# Patient Record
Sex: MALE | Race: WHITE | NOT HISPANIC OR LATINO | Employment: UNEMPLOYED | URBAN - METROPOLITAN AREA
[De-identification: names, ages, dates, MRNs, and addresses within clinical notes are randomized per-mention and may not be internally consistent; named-entity substitution may affect disease eponyms.]

---

## 2017-11-13 RX ORDER — OXYCODONE AND ACETAMINOPHEN 10; 325 MG/1; MG/1
1 TABLET ORAL 3 TIMES DAILY PRN
COMMUNITY
End: 2017-12-05

## 2017-11-13 RX ORDER — LOVASTATIN 20 MG/1
20 TABLET ORAL NIGHTLY
COMMUNITY
End: 2017-11-14

## 2017-11-13 RX ORDER — MORPHINE SULFATE 15 MG/1
15 TABLET, FILM COATED, EXTENDED RELEASE ORAL DAILY
COMMUNITY
End: 2020-07-17

## 2017-11-13 RX ORDER — BUDESONIDE AND FORMOTEROL FUMARATE DIHYDRATE 160; 4.5 UG/1; UG/1
2 AEROSOL RESPIRATORY (INHALATION)
COMMUNITY
End: 2022-12-20

## 2017-11-13 RX ORDER — CYCLOBENZAPRINE HCL 10 MG
10 TABLET ORAL 3 TIMES DAILY PRN
COMMUNITY
End: 2017-11-14

## 2017-11-13 RX ORDER — CLOTRIMAZOLE 1 %
1 CREAM (GRAM) TOPICAL 2 TIMES DAILY
COMMUNITY

## 2017-11-13 RX ORDER — LISINOPRIL 10 MG/1
10 TABLET ORAL DAILY
COMMUNITY

## 2017-11-13 RX ORDER — NORTRIPTYLINE HYDROCHLORIDE 25 MG/1
25 CAPSULE ORAL NIGHTLY PRN
COMMUNITY
End: 2023-02-23

## 2017-11-13 RX ORDER — CARVEDILOL 3.12 MG/1
3.12 TABLET ORAL 2 TIMES DAILY WITH MEALS
COMMUNITY
End: 2022-02-09

## 2017-11-13 RX ORDER — GABAPENTIN 600 MG/1
600 TABLET ORAL 4 TIMES DAILY
COMMUNITY

## 2017-11-13 RX ORDER — MELOXICAM 15 MG/1
15 TABLET ORAL DAILY
COMMUNITY

## 2017-11-14 ENCOUNTER — OFFICE VISIT (OUTPATIENT)
Dept: NEUROSURGERY | Facility: CLINIC | Age: 57
End: 2017-11-14

## 2017-11-14 VITALS — TEMPERATURE: 97.8 F | HEIGHT: 68 IN | BODY MASS INDEX: 19.1 KG/M2 | WEIGHT: 126 LBS

## 2017-11-14 DIAGNOSIS — S22.070A CLOSED WEDGE COMPRESSION FRACTURE OF NINTH THORACIC VERTEBRA, INITIAL ENCOUNTER: Primary | ICD-10-CM

## 2017-11-14 DIAGNOSIS — M51.36 DEGENERATIVE DISC DISEASE, LUMBAR: ICD-10-CM

## 2017-11-14 PROCEDURE — 99243 OFF/OP CNSLTJ NEW/EST LOW 30: CPT | Performed by: NEUROLOGICAL SURGERY

## 2017-11-14 RX ORDER — DIAZEPAM 5 MG/1
5 TABLET ORAL 2 TIMES DAILY PRN
COMMUNITY

## 2017-11-14 RX ORDER — BACLOFEN 10 MG/1
20 TABLET ORAL EVERY 4 HOURS PRN
COMMUNITY
Start: 2017-11-13 | End: 2021-10-28 | Stop reason: ALTCHOICE
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
## 2017-11-22 ENCOUNTER — HOSPITAL ENCOUNTER (OUTPATIENT)
Dept: GENERAL RADIOLOGY | Facility: HOSPITAL | Age: 57
Discharge: HOME | End: 2017-11-22
Admitting: NEUROLOGICAL SURGERY

## 2017-11-22 ENCOUNTER — PREP FOR SURGERY (OUTPATIENT)
Dept: OTHER | Facility: HOSPITAL | Age: 57
End: 2017-11-22

## 2017-11-22 ENCOUNTER — OFFICE VISIT (OUTPATIENT)
Dept: NEUROSURGERY | Facility: CLINIC | Age: 57
End: 2017-11-22

## 2017-11-22 ENCOUNTER — HOSPITAL ENCOUNTER (OUTPATIENT)
Dept: NUCLEAR MEDICINE | Facility: HOSPITAL | Age: 57
Discharge: HOME | End: 2017-11-22
Attending: NEUROLOGICAL SURGERY

## 2017-11-22 VITALS — WEIGHT: 124 LBS | TEMPERATURE: 97.8 F | BODY MASS INDEX: 18.79 KG/M2 | HEIGHT: 68 IN

## 2017-11-22 DIAGNOSIS — S22.070A CLOSED WEDGE COMPRESSION FRACTURE OF NINTH THORACIC VERTEBRA, INITIAL ENCOUNTER: ICD-10-CM

## 2017-11-22 DIAGNOSIS — M48.50XA PATHOLOGICAL COMPRESSION FRACTURE OF VERTEBRA, INITIAL ENCOUNTER (HCC): Primary | ICD-10-CM

## 2017-11-22 DIAGNOSIS — S22.060D CLOSED WEDGE COMPRESSION FRACTURE OF EIGHTH THORACIC VERTEBRA WITH ROUTINE HEALING, SUBSEQUENT ENCOUNTER: ICD-10-CM

## 2017-11-22 DIAGNOSIS — S22.070D CLOSED WEDGE COMPRESSION FRACTURE OF NINTH THORACIC VERTEBRA WITH ROUTINE HEALING, SUBSEQUENT ENCOUNTER: Primary | ICD-10-CM

## 2017-11-22 DIAGNOSIS — M51.36 DEGENERATIVE DISC DISEASE, LUMBAR: ICD-10-CM

## 2017-11-22 PROCEDURE — 99214 OFFICE O/P EST MOD 30 MIN: CPT | Performed by: NEUROLOGICAL SURGERY

## 2017-11-22 PROCEDURE — 78306 BONE IMAGING WHOLE BODY: CPT

## 2017-11-22 PROCEDURE — 0 TECHNETIUM MEDRONATE KIT: Performed by: NEUROLOGICAL SURGERY

## 2017-11-22 PROCEDURE — 72070 X-RAY EXAM THORAC SPINE 2VWS: CPT

## 2017-11-22 PROCEDURE — A9503 TC99M MEDRONATE: HCPCS | Performed by: NEUROLOGICAL SURGERY

## 2017-11-22 RX ORDER — FAMOTIDINE 20 MG/1
20 TABLET, FILM COATED ORAL
Status: CANCELLED | OUTPATIENT
Start: 2017-11-22

## 2017-11-22 RX ORDER — CEFAZOLIN SODIUM 2 G/100ML
2 INJECTION, SOLUTION INTRAVENOUS ONCE
Status: CANCELLED | OUTPATIENT
Start: 2017-11-22 | End: 2017-11-22

## 2017-11-22 RX ORDER — SODIUM CHLORIDE 0.9 % (FLUSH) 0.9 %
1-10 SYRINGE (ML) INJECTION AS NEEDED
Status: CANCELLED | OUTPATIENT
Start: 2017-11-22

## 2017-11-22 RX ORDER — TC 99M MEDRONATE 20 MG/10ML
26.9 INJECTION, POWDER, LYOPHILIZED, FOR SOLUTION INTRAVENOUS
Status: COMPLETED | OUTPATIENT
Start: 2017-11-22 | End: 2017-11-22

## 2017-11-22 RX ADMIN — Medication 26.9 MILLICURIE: at 09:11
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
## 2017-11-27 PROBLEM — M48.50XA PATHOLOGIC COMPRESSION FRACTURE OF VERTEBRA (HCC): Status: ACTIVE | Noted: 2017-11-27

## 2017-11-29 ENCOUNTER — APPOINTMENT (OUTPATIENT)
Dept: PREADMISSION TESTING | Facility: HOSPITAL | Age: 57
End: 2017-11-29

## 2017-11-29 VITALS — HEIGHT: 69 IN | BODY MASS INDEX: 19.13 KG/M2 | WEIGHT: 129.19 LBS

## 2017-11-29 LAB
DEPRECATED RDW RBC AUTO: 46.7 FL (ref 37–54)
ERYTHROCYTE [DISTWIDTH] IN BLOOD BY AUTOMATED COUNT: 13.4 % (ref 11.3–14.5)
HCT VFR BLD AUTO: 39.4 % (ref 38.9–50.9)
HGB BLD-MCNC: 12.4 G/DL (ref 13.1–17.5)
MCH RBC QN AUTO: 29.9 PG (ref 27–31)
MCHC RBC AUTO-ENTMCNC: 31.5 G/DL (ref 32–36)
MCV RBC AUTO: 94.9 FL (ref 80–99)
MRSA DNA SPEC QL NAA+PROBE: NEGATIVE
PLATELET # BLD AUTO: 241 10*3/MM3 (ref 150–450)
PMV BLD AUTO: 10.1 FL (ref 6–12)
POTASSIUM BLD-SCNC: 4.3 MMOL/L (ref 3.5–5.5)
RBC # BLD AUTO: 4.15 10*6/MM3 (ref 4.2–5.76)
WBC NRBC COR # BLD: 13.06 10*3/MM3 (ref 3.5–10.8)

## 2017-12-01 ENCOUNTER — ANESTHESIA (OUTPATIENT)
Dept: PERIOP | Facility: HOSPITAL | Age: 57
End: 2017-12-01

## 2017-12-01 ENCOUNTER — ANESTHESIA EVENT (OUTPATIENT)
Dept: PERIOP | Facility: HOSPITAL | Age: 57
End: 2017-12-01

## 2017-12-01 ENCOUNTER — HOSPITAL ENCOUNTER (OUTPATIENT)
Facility: HOSPITAL | Age: 57
Setting detail: HOSPITAL OUTPATIENT SURGERY
Discharge: HOME | End: 2017-12-01
Attending: NEUROLOGICAL SURGERY | Admitting: NEUROLOGICAL SURGERY

## 2017-12-01 ENCOUNTER — APPOINTMENT (OUTPATIENT)
Dept: GENERAL RADIOLOGY | Facility: HOSPITAL | Age: 57
End: 2017-12-01

## 2017-12-01 VITALS
SYSTOLIC BLOOD PRESSURE: 162 MMHG | TEMPERATURE: 97.9 F | HEIGHT: 69 IN | BODY MASS INDEX: 19.11 KG/M2 | WEIGHT: 129 LBS | RESPIRATION RATE: 18 BRPM | HEART RATE: 56 BPM | OXYGEN SATURATION: 92 % | DIASTOLIC BLOOD PRESSURE: 97 MMHG

## 2017-12-01 DIAGNOSIS — M48.50XA PATHOLOGICAL COMPRESSION FRACTURE OF VERTEBRA, INITIAL ENCOUNTER (HCC): ICD-10-CM

## 2017-12-01 PROCEDURE — 25010000002 NEOSTIGMINE PER 0.5 MG: Performed by: NURSE ANESTHETIST, CERTIFIED REGISTERED

## 2017-12-01 PROCEDURE — C1713 ANCHOR/SCREW BN/BN,TIS/BN: HCPCS | Performed by: NEUROLOGICAL SURGERY

## 2017-12-01 PROCEDURE — 93005 ELECTROCARDIOGRAM TRACING: CPT | Performed by: ANESTHESIOLOGY

## 2017-12-01 PROCEDURE — 25010000002 FENTANYL CITRATE (PF) 100 MCG/2ML SOLUTION: Performed by: NURSE ANESTHETIST, CERTIFIED REGISTERED

## 2017-12-01 PROCEDURE — 22510 PERQ CERVICOTHORACIC INJECT: CPT

## 2017-12-01 PROCEDURE — 25010000002 HYDROCORTISONE SODIUM SUCCINATE 100 MG RECONSTITUTED SOLUTION: Performed by: NURSE ANESTHETIST, CERTIFIED REGISTERED

## 2017-12-01 PROCEDURE — 0 IOPAMIDOL 41 % SOLUTION: Performed by: NEUROLOGICAL SURGERY

## 2017-12-01 PROCEDURE — 22515 PERQ VERTEBRAL AUGMENTATION: CPT | Performed by: NEUROLOGICAL SURGERY

## 2017-12-01 PROCEDURE — 25010000003 CEFAZOLIN IN DEXTROSE 2-4 GM/100ML-% SOLUTION: Performed by: NEUROLOGICAL SURGERY

## 2017-12-01 PROCEDURE — 71010 HC CHEST PA OR AP: CPT

## 2017-12-01 PROCEDURE — 25010000002 ONDANSETRON PER 1 MG: Performed by: NURSE ANESTHETIST, CERTIFIED REGISTERED

## 2017-12-01 PROCEDURE — 22513 PERQ VERTEBRAL AUGMENTATION: CPT | Performed by: NEUROLOGICAL SURGERY

## 2017-12-01 PROCEDURE — 25010000002 PROPOFOL 10 MG/ML EMULSION: Performed by: NURSE ANESTHETIST, CERTIFIED REGISTERED

## 2017-12-01 DEVICE — BONE CEMENT C10A KYPHON ACTIVOS 10
Type: IMPLANTABLE DEVICE | Site: SPINE THORACIC | Status: FUNCTIONAL
Brand: ACTIVOS™ 10 BONE CEMENT

## 2017-12-01 RX ORDER — FAMOTIDINE 10 MG/ML
20 INJECTION, SOLUTION INTRAVENOUS ONCE
Status: CANCELLED | OUTPATIENT
Start: 2017-12-01 | End: 2017-12-01

## 2017-12-01 RX ORDER — PROMETHAZINE HYDROCHLORIDE 25 MG/ML
6.25 INJECTION, SOLUTION INTRAMUSCULAR; INTRAVENOUS ONCE AS NEEDED
Status: DISCONTINUED | OUTPATIENT
Start: 2017-12-01 | End: 2017-12-04 | Stop reason: HOSPADM

## 2017-12-01 RX ORDER — ATRACURIUM BESYLATE 10 MG/ML
INJECTION, SOLUTION INTRAVENOUS AS NEEDED
Status: DISCONTINUED | OUTPATIENT
Start: 2017-12-01 | End: 2017-12-01 | Stop reason: SURG

## 2017-12-01 RX ORDER — MELOXICAM 7.5 MG/1
15 TABLET ORAL ONCE
Status: DISCONTINUED | OUTPATIENT
Start: 2017-12-01 | End: 2017-12-01 | Stop reason: HOSPADM

## 2017-12-01 RX ORDER — ACETAMINOPHEN 500 MG
1000 TABLET ORAL ONCE
Status: COMPLETED | OUTPATIENT
Start: 2017-12-01 | End: 2017-12-01

## 2017-12-01 RX ORDER — LIDOCAINE HYDROCHLORIDE 10 MG/ML
INJECTION, SOLUTION INFILTRATION; PERINEURAL AS NEEDED
Status: DISCONTINUED | OUTPATIENT
Start: 2017-12-01 | End: 2017-12-01 | Stop reason: SURG

## 2017-12-01 RX ORDER — ESMOLOL HYDROCHLORIDE 10 MG/ML
INJECTION INTRAVENOUS AS NEEDED
Status: DISCONTINUED | OUTPATIENT
Start: 2017-12-01 | End: 2017-12-01 | Stop reason: SURG

## 2017-12-01 RX ORDER — CEFAZOLIN SODIUM 2 G/100ML
2 INJECTION, SOLUTION INTRAVENOUS ONCE
Status: COMPLETED | OUTPATIENT
Start: 2017-12-01 | End: 2017-12-01

## 2017-12-01 RX ORDER — PROMETHAZINE HYDROCHLORIDE 25 MG/1
25 TABLET ORAL ONCE AS NEEDED
Status: DISCONTINUED | OUTPATIENT
Start: 2017-12-01 | End: 2017-12-04 | Stop reason: HOSPADM

## 2017-12-01 RX ORDER — GLYCOPYRROLATE 0.2 MG/ML
INJECTION INTRAMUSCULAR; INTRAVENOUS AS NEEDED
Status: DISCONTINUED | OUTPATIENT
Start: 2017-12-01 | End: 2017-12-01 | Stop reason: SURG

## 2017-12-01 RX ORDER — FENTANYL CITRATE 50 UG/ML
50 INJECTION, SOLUTION INTRAMUSCULAR; INTRAVENOUS
Status: COMPLETED | OUTPATIENT
Start: 2017-12-01 | End: 2017-12-01

## 2017-12-01 RX ORDER — ONDANSETRON 2 MG/ML
INJECTION INTRAMUSCULAR; INTRAVENOUS AS NEEDED
Status: DISCONTINUED | OUTPATIENT
Start: 2017-12-01 | End: 2017-12-01 | Stop reason: SURG

## 2017-12-01 RX ORDER — FAMOTIDINE 20 MG/1
20 TABLET, FILM COATED ORAL ONCE
Status: COMPLETED | OUTPATIENT
Start: 2017-12-01 | End: 2017-12-01

## 2017-12-01 RX ORDER — SODIUM CHLORIDE, SODIUM LACTATE, POTASSIUM CHLORIDE, CALCIUM CHLORIDE 600; 310; 30; 20 MG/100ML; MG/100ML; MG/100ML; MG/100ML
9 INJECTION, SOLUTION INTRAVENOUS CONTINUOUS
Status: DISCONTINUED | OUTPATIENT
Start: 2017-12-01 | End: 2017-12-04 | Stop reason: HOSPADM

## 2017-12-01 RX ORDER — PREDNISONE 2.5 MG
2.5 TABLET ORAL DAILY
Status: ON HOLD | COMMUNITY
End: 2017-12-01 | Stop reason: SDUPTHER

## 2017-12-01 RX ORDER — PROPOFOL 10 MG/ML
VIAL (ML) INTRAVENOUS AS NEEDED
Status: DISCONTINUED | OUTPATIENT
Start: 2017-12-01 | End: 2017-12-01 | Stop reason: SURG

## 2017-12-01 RX ORDER — PROMETHAZINE HYDROCHLORIDE 25 MG/1
25 SUPPOSITORY RECTAL ONCE AS NEEDED
Status: DISCONTINUED | OUTPATIENT
Start: 2017-12-01 | End: 2017-12-04 | Stop reason: HOSPADM

## 2017-12-01 RX ORDER — SODIUM CHLORIDE 0.9 % (FLUSH) 0.9 %
1-10 SYRINGE (ML) INJECTION AS NEEDED
Status: DISCONTINUED | OUTPATIENT
Start: 2017-12-01 | End: 2017-12-01 | Stop reason: HOSPADM

## 2017-12-01 RX ORDER — MAGNESIUM HYDROXIDE 1200 MG/15ML
LIQUID ORAL AS NEEDED
Status: DISCONTINUED | OUTPATIENT
Start: 2017-12-01 | End: 2017-12-01 | Stop reason: HOSPADM

## 2017-12-01 RX ORDER — EPHEDRINE SULFATE 50 MG/ML
5 INJECTION, SOLUTION INTRAVENOUS ONCE AS NEEDED
Status: DISCONTINUED | OUTPATIENT
Start: 2017-12-01 | End: 2017-12-04 | Stop reason: HOSPADM

## 2017-12-01 RX ORDER — LIDOCAINE HYDROCHLORIDE 10 MG/ML
0.5 INJECTION, SOLUTION EPIDURAL; INFILTRATION; INTRACAUDAL; PERINEURAL ONCE AS NEEDED
Status: COMPLETED | OUTPATIENT
Start: 2017-12-01 | End: 2017-12-01

## 2017-12-01 RX ADMIN — FENTANYL CITRATE 50 MCG: 50 INJECTION INTRAMUSCULAR; INTRAVENOUS at 09:49

## 2017-12-01 RX ADMIN — LIDOCAINE HYDROCHLORIDE 50 MG: 10 INJECTION, SOLUTION INFILTRATION; PERINEURAL at 08:16

## 2017-12-01 RX ADMIN — FAMOTIDINE 20 MG: 20 TABLET, FILM COATED ORAL at 07:19

## 2017-12-01 RX ADMIN — Medication 4 MG: at 09:02

## 2017-12-01 RX ADMIN — ESMOLOL HYDROCHLORIDE 20 MG: 10 INJECTION, SOLUTION INTRAVENOUS at 08:16

## 2017-12-01 RX ADMIN — ACETAMINOPHEN 1000 MG: 500 TABLET ORAL at 07:32

## 2017-12-01 RX ADMIN — GLYCOPYRROLATE 0.4 MG: 0.2 INJECTION, SOLUTION INTRAMUSCULAR; INTRAVENOUS at 09:02

## 2017-12-01 RX ADMIN — FENTANYL CITRATE 50 MCG: 50 INJECTION INTRAMUSCULAR; INTRAVENOUS at 09:42

## 2017-12-01 RX ADMIN — FENTANYL CITRATE 50 MCG: 50 INJECTION INTRAMUSCULAR; INTRAVENOUS at 09:34

## 2017-12-01 RX ADMIN — PROPOFOL 100 MG: 10 INJECTION, EMULSION INTRAVENOUS at 08:16

## 2017-12-01 RX ADMIN — FENTANYL CITRATE 50 MCG: 50 INJECTION INTRAMUSCULAR; INTRAVENOUS at 10:21

## 2017-12-01 RX ADMIN — ATRACURIUM BESYLATE 25 MG: 10 INJECTION, SOLUTION INTRAVENOUS at 08:16

## 2017-12-01 RX ADMIN — EPHEDRINE SULFATE 15 MG: 50 INJECTION INTRAMUSCULAR; INTRAVENOUS; SUBCUTANEOUS at 08:29

## 2017-12-01 RX ADMIN — CEFAZOLIN SODIUM 2 G: 2 INJECTION, SOLUTION INTRAVENOUS at 08:14

## 2017-12-01 RX ADMIN — ONDANSETRON 4 MG: 2 INJECTION INTRAMUSCULAR; INTRAVENOUS at 08:55

## 2017-12-01 RX ADMIN — FENTANYL CITRATE 50 MCG: 50 INJECTION INTRAMUSCULAR; INTRAVENOUS at 11:20

## 2017-12-01 RX ADMIN — SODIUM CHLORIDE, POTASSIUM CHLORIDE, SODIUM LACTATE AND CALCIUM CHLORIDE 9 ML/HR: 600; 310; 30; 20 INJECTION, SOLUTION INTRAVENOUS at 07:04

## 2017-12-01 RX ADMIN — HYDROCORTISONE SODIUM SUCCINATE 100 MG: 100 INJECTION, POWDER, FOR SOLUTION INTRAMUSCULAR; INTRAVENOUS at 08:22

## 2017-12-01 RX ADMIN — LIDOCAINE HYDROCHLORIDE 0.2 ML: 10 INJECTION, SOLUTION EPIDURAL; INFILTRATION; INTRACAUDAL; PERINEURAL at 07:04

## 2017-12-01 NOTE — ANESTHESIA POSTPROCEDURE EVALUATION
Patient: Rodolfo Hill    Procedure Summary     Date Anesthesia Start Anesthesia Stop Room / Location    12/01/17 0814 0911 [facility] / [facility]       Procedure Diagnosis Surgeon Provider    KYPHOPLASTY T8 and T9 (N/A Spine Lumbar) Pathological compression fracture of vertebra, initial encounter  (Pathological compression fracture of vertebra, initial encounter [M48.50XA]) MD Fahad Boss MD          Anesthesia Type: general  Last vitals  BP   154/81 (12/01/17 0908)   Temp   97.9 °F (36.6 °C) (12/01/17 0908)   Pulse   58 (12/01/17 0908)   Resp   18 (12/01/17 0908)     SpO2   100 % (12/01/17 0908)     Post Anesthesia Care and Evaluation    Patient location during evaluation: PACU  Patient participation: complete - patient participated  Level of consciousness: awake and alert  Pain score: 0  Pain management: adequate  Airway patency: patent  Anesthetic complications: No anesthetic complications  PONV Status: none  Cardiovascular status: hemodynamically stable and acceptable  Respiratory status: nonlabored ventilation, acceptable and nasal cannula  Hydration status: acceptable

## 2017-12-01 NOTE — ANESTHESIA PROCEDURE NOTES
Airway  Urgency: elective    Airway not difficult    General Information and Staff    Patient location during procedure: OR  CRNA: [facility]UGH[facility]    Indications and Patient Condition  Indications for airway management: airway protection    Preoxygenated: yes  MILS not maintained throughout  Mask difficulty assessment: 1 - vent by mask    Final Airway Details  Final airway type: endotracheal airway      Successful airway: ETT  Cuffed: yes   Successful intubation technique: direct laryngoscopy  Endotracheal tube insertion site: oral  Blade: Stacy  Blade size: #3  ETT size: 7.0 mm  Cormack-Lehane Classification: grade I - full view of glottis  Placement verified by: chest auscultation and capnometry   Cuff volume (mL): 8  Measured from: lips  ETT to lips (cm): 20  Number of attempts at approach: 1    Additional Comments  Negative epigastric sounds, Breath sound equal bilaterally with symmetric chest rise and fall. Atraumatic, no damage to lips or gums during intubation

## 2017-12-01 NOTE — INTERVAL H&P NOTE
"Pre-Op H&P (See Recent Office Note Attached)    Chief complaint: mid back pain    Review of Systems:  General ROS:  no fever, chills, rashes, No change since last office visit  Cardiovascular ROS: no chest pain or dyspnea on exertion  Respiratory ROS: no cough, + baseline shortness of breath, or wheezing    Immunization History:   Influenza:  Yes 2017  Pneumococcal:  Yes < 5 years  Tetanus:  unknown    Vital Signs:  /92 (BP Location: Right arm, Patient Position: Lying)  Pulse 67  Temp 97.7 °F (36.5 °C) (Temporal Artery )   Resp 20  Ht 69\" (175.3 cm)  Wt 129 lb (58.5 kg)  SpO2 92%  BMI 19.05 kg/m2    Physical Exam:    CV:  S1S2 regular rate and rhythm, no murmur               Resp:  Clear to auscultation; respirations regular, even and unlabored    Results Review:    I reviewed the patient's new clinical results.    Cancer Staging (if applicable)  Cancer Patient: __ yes _x_no __unknown; If yes, clinical stage T:__ N:__M:__, stage group or __N/A    Jane Wyatt, APRN  12/1/2017   7:28 AM      "

## 2017-12-01 NOTE — OP NOTE
NEUROSURGICAL OPERATIVE NOTE        PREOPERATIVE DIAGNOSIS:    T8 and T9 compression fractures  Osteoporosis      POSTOPERATIVE DIAGNOSIS:  Same      PROCEDURE:  T8 and T9 kyphoplasty      SURGEON:  Fahad Santana M.D.      ASSISTANT:  None      ANESTHESIA:  General      ESTIMATED BLOOD LOSS:  Minimal      SPECIMEN:  None      DRAINS:  None      COMPLICATIONS:  None      CLINICAL NOTE:  The patient is a 57-year-old gentleman with known osteoporosis and arthritis who presents with severe unremitting mid back pain.  Studies reveals subacute fractures at T8 and T9, much more significant at T9.  Given his ongoing symptoms he now presents for kyphoplasty.  The nature of the procedure as well as the potential risks, complications, limitations, and alternatives to the procedure were discussed at length with the patient and the patient has agreed to proceed with surgery.      TECHNICAL NOTE:  The patient was brought to the operating room and while on his cart general endotracheal anesthesia was achieved.  He was then turned prone onto blanket rolls maintained longitudinally under his chest and abdomen. Special care was ensured to protect pressure points. Two C-arms were brought into use and aligned to provide good AP and lateral imaging of the T8 and T9 fracture sites. The mid back was prepared and draped in usual fashion. Punctate incisions were made lateral to the pedicles as noted on the AP imaging. One-Step cannulae were impacted through the pedicles into the dorsal vertebral body. This was followed by hand drill on each side. Then, 15 mm Kyphon balloons were inserted and inflated. I was able to reduce the T9 fracture. The balloons were lowered and removed and approximately 3 mL of methylmethacrylate were instilled on each side in the standard fashion. The cement was allowed to harden and the working cannulae were removed. The procedure was then repeated in its entirety at the T8 level. Once again, I was able to  reduce the fracture which, by the way, had progressed from the time of his last study. Once again, 3 mL of methylmethacrylate were instilled on each side. The cement was allowed to harden and the working cannulae were removed. The punctate incisions were closed in a subcuticular fashion with a single 3-0 Vicryl suture at each site. These 4 punctate incisions were then closed with a dermal sealant. The patient was rolled onto his cart, extubated, and taken to recovery room in satisfactory condition. He did receive preoperative antibiotics, and there were no overt intraoperative complications.            Fahad Santana M.D.

## 2017-12-01 NOTE — ANESTHESIA PREPROCEDURE EVALUATION
Anesthesia Evaluation     NPO Solid Status: > 8 hours  NPO Liquid Status: > 8 hours     Airway   Mallampati: II  TM distance: >3 FB  Neck ROM: full  no difficulty expected  Dental    (+) edentulous    Pulmonary - normal exam   (+) a smoker Current, COPD,   Cardiovascular     ECG reviewed  Rhythm: regular  Rate: normal    (+) hypertension,   (-) pacemaker, angina, murmur, cardiac stents, CABG      Neuro/Psych  (-) seizures, TIA, CVA  GI/Hepatic/Renal/Endo    (-) liver disease, no renal disease, diabetes    Musculoskeletal     (+) back pain (has RA rheumatoid arthritis, takes prednisone),   Abdominal    Substance History      OB/GYN          Other                                        Anesthesia Plan    ASA 3     general   (GA  Careful,neck in neutral intubation  Steroid boost  Solu cortef 100mg)

## 2017-12-04 ENCOUNTER — TELEPHONE (OUTPATIENT)
Dept: NEUROSURGERY | Facility: CLINIC | Age: 57
End: 2017-12-04

## 2017-12-04 ENCOUNTER — HOSPITAL ENCOUNTER (EMERGENCY)
Facility: HOSPITAL | Age: 57
Discharge: LEFT BEFORE DISCHARGE | End: 2017-12-04

## 2017-12-04 VITALS
RESPIRATION RATE: 20 BRPM | HEIGHT: 67 IN | SYSTOLIC BLOOD PRESSURE: 178 MMHG | HEART RATE: 81 BPM | DIASTOLIC BLOOD PRESSURE: 96 MMHG | BODY MASS INDEX: 20.25 KG/M2 | OXYGEN SATURATION: 93 % | WEIGHT: 129 LBS | TEMPERATURE: 97.8 F

## 2017-12-04 PROCEDURE — 99211 OFF/OP EST MAY X REQ PHY/QHP: CPT

## 2017-12-04 NOTE — ED NOTES
Patient left without being seen. Says he is going to see Dr. Donis in the morning.     Krissy Laurent  12/04/17 2934

## 2017-12-04 NOTE — TELEPHONE ENCOUNTER
Provider:  Max  Caller: patient  Time of call:  1:27   Phone #:  [phone]  Surgery:  Kyphoplasty T8-T9  Surgery Date:  12-01-17  Last visit:   same  Next visit: None    YOSHI:         Reason for call:  Patient states that immediately after his kypho, he had increased low back pain and he says his right lung hurts.  I asked him to give me time to get a PA, and he said that he is already in route.  He has been doubling up on Percocet for his pain.  I told him that he would run out early and he didn't seem to care.

## 2017-12-05 ENCOUNTER — HOSPITAL ENCOUNTER (EMERGENCY)
Facility: HOSPITAL | Age: 57
Discharge: HOME | End: 2017-12-05
Attending: EMERGENCY MEDICINE | Admitting: EMERGENCY MEDICINE

## 2017-12-05 ENCOUNTER — APPOINTMENT (OUTPATIENT)
Dept: CT IMAGING | Facility: HOSPITAL | Age: 57
End: 2017-12-05

## 2017-12-05 VITALS
OXYGEN SATURATION: 96 % | BODY MASS INDEX: 20.28 KG/M2 | RESPIRATION RATE: 16 BRPM | SYSTOLIC BLOOD PRESSURE: 174 MMHG | WEIGHT: 129.19 LBS | HEART RATE: 58 BPM | DIASTOLIC BLOOD PRESSURE: 94 MMHG | TEMPERATURE: 98.5 F | HEIGHT: 67 IN

## 2017-12-05 DIAGNOSIS — I10 ELEVATED BLOOD PRESSURE READING WITH DIAGNOSIS OF HYPERTENSION: Primary | ICD-10-CM

## 2017-12-05 DIAGNOSIS — S22.41XA CLOSED FRACTURE OF MULTIPLE RIBS OF RIGHT SIDE, INITIAL ENCOUNTER: ICD-10-CM

## 2017-12-05 DIAGNOSIS — S22.078A OTHER CLOSED FRACTURE OF NINTH THORACIC VERTEBRA, INITIAL ENCOUNTER (HCC): ICD-10-CM

## 2017-12-05 DIAGNOSIS — R07.9 CHEST PAIN, UNSPECIFIED TYPE: ICD-10-CM

## 2017-12-05 LAB
ALBUMIN SERPL-MCNC: 4.2 G/DL (ref 3.2–4.8)
ALBUMIN/GLOB SERPL: 1.7 G/DL (ref 1.5–2.5)
ALP SERPL-CCNC: 134 U/L (ref 25–100)
ALT SERPL W P-5'-P-CCNC: 9 U/L (ref 7–40)
ANION GAP SERPL CALCULATED.3IONS-SCNC: 3 MMOL/L (ref 3–11)
AST SERPL-CCNC: 12 U/L (ref 0–33)
BASOPHILS # BLD AUTO: 0.03 10*3/MM3 (ref 0–0.2)
BASOPHILS NFR BLD AUTO: 0.4 % (ref 0–1)
BILIRUB SERPL-MCNC: 0.3 MG/DL (ref 0.3–1.2)
BNP SERPL-MCNC: 73 PG/ML (ref 0–100)
BUN BLD-MCNC: 17 MG/DL (ref 9–23)
BUN/CREAT SERPL: 24.3 (ref 7–25)
CALCIUM SPEC-SCNC: 9.4 MG/DL (ref 8.7–10.4)
CHLORIDE SERPL-SCNC: 101 MMOL/L (ref 99–109)
CO2 SERPL-SCNC: 34 MMOL/L (ref 20–31)
CREAT BLD-MCNC: 0.7 MG/DL (ref 0.6–1.3)
DEPRECATED RDW RBC AUTO: 46 FL (ref 37–54)
EOSINOPHIL # BLD AUTO: 0.28 10*3/MM3 (ref 0–0.3)
EOSINOPHIL NFR BLD AUTO: 3.3 % (ref 0–3)
ERYTHROCYTE [DISTWIDTH] IN BLOOD BY AUTOMATED COUNT: 13.4 % (ref 11.3–14.5)
GFR SERPL CREATININE-BSD FRML MDRD: 116 ML/MIN/1.73
GLOBULIN UR ELPH-MCNC: 2.5 GM/DL
GLUCOSE BLD-MCNC: 102 MG/DL (ref 70–100)
HCT VFR BLD AUTO: 39 % (ref 38.9–50.9)
HGB BLD-MCNC: 12.6 G/DL (ref 13.1–17.5)
IMM GRANULOCYTES # BLD: 0.02 10*3/MM3 (ref 0–0.03)
IMM GRANULOCYTES NFR BLD: 0.2 % (ref 0–0.6)
LYMPHOCYTES # BLD AUTO: 1.16 10*3/MM3 (ref 0.6–4.8)
LYMPHOCYTES NFR BLD AUTO: 13.8 % (ref 24–44)
MCH RBC QN AUTO: 30.2 PG (ref 27–31)
MCHC RBC AUTO-ENTMCNC: 32.3 G/DL (ref 32–36)
MCV RBC AUTO: 93.5 FL (ref 80–99)
MONOCYTES # BLD AUTO: 0.88 10*3/MM3 (ref 0–1)
MONOCYTES NFR BLD AUTO: 10.5 % (ref 0–12)
NEUTROPHILS # BLD AUTO: 6.02 10*3/MM3 (ref 1.5–8.3)
NEUTROPHILS NFR BLD AUTO: 71.8 % (ref 41–71)
PLATELET # BLD AUTO: 231 10*3/MM3 (ref 150–450)
PMV BLD AUTO: 9.6 FL (ref 6–12)
POTASSIUM BLD-SCNC: 4.2 MMOL/L (ref 3.5–5.5)
PROT SERPL-MCNC: 6.7 G/DL (ref 5.7–8.2)
RBC # BLD AUTO: 4.17 10*6/MM3 (ref 4.2–5.76)
SODIUM BLD-SCNC: 138 MMOL/L (ref 132–146)
TROPONIN I SERPL-MCNC: 0 NG/ML (ref 0–0.07)
WBC NRBC COR # BLD: 8.39 10*3/MM3 (ref 3.5–10.8)

## 2017-12-05 PROCEDURE — 72131 CT LUMBAR SPINE W/O DYE: CPT

## 2017-12-05 PROCEDURE — 85025 COMPLETE CBC W/AUTO DIFF WBC: CPT | Performed by: EMERGENCY MEDICINE

## 2017-12-05 PROCEDURE — 99284 EMERGENCY DEPT VISIT MOD MDM: CPT

## 2017-12-05 PROCEDURE — 84484 ASSAY OF TROPONIN QUANT: CPT

## 2017-12-05 PROCEDURE — 72128 CT CHEST SPINE W/O DYE: CPT

## 2017-12-05 PROCEDURE — 93005 ELECTROCARDIOGRAM TRACING: CPT | Performed by: EMERGENCY MEDICINE

## 2017-12-05 PROCEDURE — 0 IOPAMIDOL PER 1 ML: Performed by: EMERGENCY MEDICINE

## 2017-12-05 PROCEDURE — 71275 CT ANGIOGRAPHY CHEST: CPT

## 2017-12-05 PROCEDURE — 80053 COMPREHEN METABOLIC PANEL: CPT | Performed by: EMERGENCY MEDICINE

## 2017-12-05 PROCEDURE — 83880 ASSAY OF NATRIURETIC PEPTIDE: CPT | Performed by: EMERGENCY MEDICINE

## 2017-12-05 RX ORDER — OXYCODONE AND ACETAMINOPHEN 10; 325 MG/1; MG/1
1 TABLET ORAL ONCE
Status: COMPLETED | OUTPATIENT
Start: 2017-12-05 | End: 2017-12-05

## 2017-12-05 RX ORDER — OXYCODONE AND ACETAMINOPHEN 10; 325 MG/1; MG/1
1 TABLET ORAL EVERY 4 HOURS PRN
Qty: 16 TABLET | Refills: 0 | Status: SHIPPED | OUTPATIENT
Start: 2017-12-05 | End: 2018-01-05

## 2017-12-05 RX ADMIN — OXYCODONE HYDROCHLORIDE AND ACETAMINOPHEN 1 TABLET: 10; 325 TABLET ORAL at 14:42

## 2017-12-05 RX ADMIN — IOPAMIDOL 65 ML: 755 INJECTION, SOLUTION INTRAVENOUS at 12:25

## 2017-12-05 RX ADMIN — OXYCODONE HYDROCHLORIDE AND ACETAMINOPHEN 1 TABLET: 10; 325 TABLET ORAL at 11:04

## 2017-12-05 NOTE — TELEPHONE ENCOUNTER
Reviewed [facility] ED notes and prelim results of CT lumbar spine and CT thoracic spine as I am on call with Dr. Santana today/patricia.  Will await any formal neurosurgical consultation as needed.

## 2017-12-05 NOTE — ED PROVIDER NOTES
Subjective   HPI Comments: Rodolfo Hill is a 57 y.o.male who presents to the ED with c/o left sided chest pain onset 4 days ago. He states his left sided chest pain worsens with coughing. He also c/o of right sided shoulder pain, lower back pain, fever, chills, cough but denies shortness of breath or any other complaints at this time. He reports he developed a deep cough 4 days ago and right shoulder pain yesterday. He states that his lower back pain is significant worse than his baseline.  He reports that he woke up from surgery several days ago with the pain under his right scapula.  He recently had a kyphoplasty performed by Dr. Donis on 12/1/2017. He has a history of MI, COPD, and osteoarthritis.       Patient is a 57 y.o. male presenting with chest pain.   History provided by:  Patient  Chest Pain   Pain location:  L chest  Pain quality: aching    Pain radiates to:  R shoulder, lower back and mid back  Pain severity:  Moderate  Onset quality:  Gradual  Duration:  4 days  Timing:  Constant  Progression:  Worsening  Chronicity:  New  Context: at rest    Context comment:  Coughing  Relieved by:  Nothing  Worsened by:  Coughing and movement  Ineffective treatments:  Rest  Associated symptoms: back pain, cough, fever and weakness    Associated symptoms: no shortness of breath    Risk factors: coronary artery disease and male sex        Review of Systems   Constitutional: Positive for chills and fever.   Respiratory: Positive for cough. Negative for shortness of breath.    Cardiovascular: Positive for chest pain.   Musculoskeletal: Positive for arthralgias (right shoulder pain) and back pain.   Neurological: Positive for weakness.   All other systems reviewed and are negative.      Past Medical History:   Diagnosis Date   • Anxiety    • COPD (chronic obstructive pulmonary disease)    • Depression    • Hyperlipidemia    • Hypertension    • Myocardial infarct    • Osteoporosis    • Rheumatoid arthritis    • Sleep  apnea with use of continuous positive airway pressure (CPAP)        Allergies   Allergen Reactions   • Alupent [Metaproterenol] Arrhythmia     Chest pain, increased heart rate.       Past Surgical History:   Procedure Laterality Date   • CARPAL TUNNEL RELEASE     • KYPHOPLASTY N/A 12/1/2017    Procedure: KYPHOPLASTY T8-9;  Surgeon: Fahad Santana MD;  Location: [facility];  Service:        Family History   Problem Relation Age of Onset   • Cancer Father        Social History     Social History   • Marital status: Legally      Spouse name: N/A   • Number of children: N/A   • Years of education: N/A     Social History Main Topics   • Smoking status: Current Every Day Smoker     Packs/day: 1.50     Years: 40.00     Types: Cigarettes   • Smokeless tobacco: Never Used   • Alcohol use Yes      Comment: rarely   • Drug use: No   • Sexual activity: Defer     Other Topics Concern   • None     Social History Narrative         Objective   Physical Exam   Constitutional: He is oriented to person, place, and time. He appears well-developed and well-nourished. No distress.   HENT:   Head: Normocephalic and atraumatic.   Nose: Nose normal.   Eyes: Conjunctivae are normal. No scleral icterus.   Neck: Normal range of motion. Neck supple.   Cardiovascular: Normal rate, regular rhythm and normal heart sounds.    No murmur heard.  Pulmonary/Chest: Effort normal. He has rales (both bases).   Abdominal: Soft. Bowel sounds are normal. He exhibits no distension. There is no tenderness.   Musculoskeletal: Normal range of motion. He exhibits tenderness.   Mild diffuse tenderness over lumbar and thoracic spine.    Neurological: He is alert and oriented to person, place, and time.   Skin: Skin is warm and dry.   Psychiatric: He has a normal mood and affect.   Nursing note and vitals reviewed.      Procedures         ED Course  ED Course   Comment By Time   Mr. jalloh reports that since the surgery not only is he had chest pain but  his back pain in his low back and mid back have increased over his baseline of chronic pain.  While we are obtaining a CT angiogram to evaluate for PE will also scan his spine Edenilson Gaytan MD 12/05 0322   CT chest is negative for pulmonary embolism.  I reviewed the voice clip for the remainder of CTs.  I reviewed his old records.  I have paged Dr. Romero to discuss.  He has nondisplaced rib fractures elevated 9 as well as a transverse process fracture and bilateral pedicle fractures at T9. Edenilson Gaytan MD 12/05 7550   I discussed with Dr. Romero.  He will follow him up in the next several days.  Recommended additional pain medicine if needed. Edenilson Gaytan MD 12/05 3686   I spoke with Mr. jalloh and his wife about findings.  He is prescribed narcotics through pain management doctor and was given a prescription by Dr. Romero after his surgery.  I'd knowledge these facts but he has several fractures and has had to increase his pain medication usage and after discussing with Dr. Romero I will write a prescription for additional pain medication . Edenilson Gaytan MD 12/05 4063     Recent Results (from the past 24 hour(s))   POC Troponin, Rapid    Collection Time: 12/05/17 10:35 AM   Result Value Ref Range    Troponin I 0.00 0.00 - 0.07 ng/mL   Comprehensive Metabolic Panel    Collection Time: 12/05/17 10:36 AM   Result Value Ref Range    Glucose 102 (H) 70 - 100 mg/dL    BUN 17 9 - 23 mg/dL    Creatinine 0.70 0.60 - 1.30 mg/dL    Sodium 138 132 - 146 mmol/L    Potassium 4.2 3.5 - 5.5 mmol/L    Chloride 101 99 - 109 mmol/L    CO2 34.0 (H) 20.0 - 31.0 mmol/L    Calcium 9.4 8.7 - 10.4 mg/dL    Total Protein 6.7 5.7 - 8.2 g/dL    Albumin 4.20 3.20 - 4.80 g/dL    ALT (SGPT) 9 7 - 40 U/L    AST (SGOT) 12 0 - 33 U/L    Alkaline Phosphatase 134 (H) 25 - 100 U/L    Total Bilirubin 0.3 0.3 - 1.2 mg/dL    eGFR Non African Amer 116 >60 mL/min/1.73    Globulin 2.5 gm/dL    A/G Ratio 1.7 1.5 - 2.5 g/dL    BUN/Creatinine Ratio  24.3 7.0 - 25.0    Anion Gap 3.0 3.0 - 11.0 mmol/L   BNP    Collection Time: 12/05/17 10:36 AM   Result Value Ref Range    BNP 73.0 0.0 - 100.0 pg/mL   CBC Auto Differential    Collection Time: 12/05/17 10:36 AM   Result Value Ref Range    WBC 8.39 3.50 - 10.80 10*3/mm3    RBC 4.17 (L) 4.20 - 5.76 10*6/mm3    Hemoglobin 12.6 (L) 13.1 - 17.5 g/dL    Hematocrit 39.0 38.9 - 50.9 %    MCV 93.5 80.0 - 99.0 fL    MCH 30.2 27.0 - 31.0 pg    MCHC 32.3 32.0 - 36.0 g/dL    RDW 13.4 11.3 - 14.5 %    RDW-SD 46.0 37.0 - 54.0 fl    MPV 9.6 6.0 - 12.0 fL    Platelets 231 150 - 450 10*3/mm3    Neutrophil % 71.8 (H) 41.0 - 71.0 %    Lymphocyte % 13.8 (L) 24.0 - 44.0 %    Monocyte % 10.5 0.0 - 12.0 %    Eosinophil % 3.3 (H) 0.0 - 3.0 %    Basophil % 0.4 0.0 - 1.0 %    Immature Grans % 0.2 0.0 - 0.6 %    Neutrophils, Absolute 6.02 1.50 - 8.30 10*3/mm3    Lymphocytes, Absolute 1.16 0.60 - 4.80 10*3/mm3    Monocytes, Absolute 0.88 0.00 - 1.00 10*3/mm3    Eosinophils, Absolute 0.28 0.00 - 0.30 10*3/mm3    Basophils, Absolute 0.03 0.00 - 0.20 10*3/mm3    Immature Grans, Absolute 0.02 0.00 - 0.03 10*3/mm3     Note: In addition to lab results from this visit, the labs listed above may include labs taken at another facility or during a different encounter within the last 24 hours. Please correlate lab times with ED admission and discharge times for further clarification of the services performed during this visit.    CT Lumbar Spine Without Contrast   Preliminary Result   No evidence of acute lumbar spine trauma. Mild multilevel   degenerative disc disease without evidence of high-grade canal stenosis.       D:  12/05/2017   E:  12/05/2017              CT Thoracic Spine Without Contrast   Preliminary Result   1. Previous kyphoplasties at T8 and T9. No new thoracic vertebral   compression fracture is seen.   2. Healing fractures of the head of the right eighth and ninth ribs,   healing right transverse process fracture and bilateral pedicle    fractures of T9.       D:  12/05/2017   E:  12/05/2017              CT Angiogram Chest With Contrast   Preliminary Result   No evidence of pulmonary embolus or other acute chest   disease.       D:  12/05/2017   E:  12/05/2017            Vitals:    12/05/17 1242 12/05/17 1300 12/05/17 1330 12/05/17 1400   BP:  170/92 169/96 174/94   BP Location:       Patient Position:       Pulse: 63 65 63 58   Resp:       Temp:       TempSrc:       SpO2: 91% 95% 94% 96%   Weight:       Height:         Medications   oxyCODONE-acetaminophen (PERCOCET)  MG per tablet 1 tablet (1 tablet Oral Given 12/5/17 1104)   iopamidol (ISOVUE-370) 76 % injection 100 mL (65 mL Intravenous Given 12/5/17 1225)   oxyCODONE-acetaminophen (PERCOCET)  MG per tablet 1 tablet (1 tablet Oral Given 12/5/17 1442)     ECG/EMG Results (last 24 hours)     Procedure Component Value Units Date/Time    ECG 12 Lead [[id number]] Collected:  12/05/17 1038     Updated:  12/05/17 1040                        MDM  Number of Diagnoses or Management Options  new and requires workup  new and does not require workup     Amount and/or Complexity of Data Reviewed  Tests in the radiology section of CPT®: ordered and reviewed  Review and summarize past medical records: yes  Discuss the patient with other providers: yes    Patient Progress  Patient progress: improved      Final diagnoses:   Elevated blood pressure reading with diagnosis of hypertension   Other closed fracture of ninth thoracic vertebra, initial encounter   Closed fracture of multiple ribs of right side, initial encounter   Chest pain, unspecified type       Documentation assistance provided by brittany Skinner.  Information recorded by the brittany was done at my direction and has been verified and validated by me.     Mynor Skinner  12/05/17 1057       Edenilson Gaytan MD  12/05/17 6377

## 2017-12-05 NOTE — DISCHARGE INSTRUCTIONS
Don't drive while taking the pain medication.  Force herself to cough and breathe deeply frequently throughout the day.  Call Dr. Romero's office today and tell them that he wanted to see you in the next few days.  Return here if any concerns.    Hypertension  Hypertension, commonly called high blood pressure, is when the force of blood pumping through your arteries is too strong. Your arteries are the blood vessels that carry blood from your heart throughout your body. A blood pressure reading consists of a higher number over a lower number, such as 110/72. The higher number (systolic) is the pressure inside your arteries when your heart pumps. The lower number (diastolic) is the pressure inside your arteries when your heart relaxes. Ideally you want your blood pressure below 120/80.  Hypertension forces your heart to work harder to pump blood. Your arteries may become narrow or stiff. Having untreated or uncontrolled hypertension can cause heart attack, stroke, kidney disease, and other problems.  RISK FACTORS  Some risk factors for high blood pressure are controllable. Others are not.   Risk factors you cannot control include:   · Race. You may be at higher risk if you are .  · Age. Risk increases with age.  · Gender. Men are at higher risk than women before age 45 years. After age 65, women are at higher risk than men.  Risk factors you can control include:  · Not getting enough exercise or physical activity.  · Being overweight.  · Getting too much fat, sugar, calories, or salt in your diet.  · Drinking too much alcohol.  SIGNS AND SYMPTOMS  Hypertension does not usually cause signs or symptoms. Extremely high blood pressure (hypertensive crisis) may cause headache, anxiety, shortness of breath, and nosebleed.  DIAGNOSIS  To check if you have hypertension, your health care provider will measure your blood pressure while you are seated, with your arm held at the level of your heart. It should be  measured at least twice using the same arm. Certain conditions can cause a difference in blood pressure between your right and left arms. A blood pressure reading that is higher than normal on one occasion does not mean that you need treatment. If it is not clear whether you have high blood pressure, you may be asked to return on a different day to have your blood pressure checked again. Or, you may be asked to monitor your blood pressure at home for 1 or more weeks.  TREATMENT  Treating high blood pressure includes making lifestyle changes and possibly taking medicine. Living a healthy lifestyle can help lower high blood pressure. You may need to change some of your habits.  Lifestyle changes may include:  · Following the DASH diet. This diet is high in fruits, vegetables, and whole grains. It is low in salt, red meat, and added sugars.  · Keep your sodium intake below 2,300 mg per day.  · Getting at least 30-45 minutes of aerobic exercise at least 4 times per week.  · Losing weight if necessary.  · Not smoking.  · Limiting alcoholic beverages.  · Learning ways to reduce stress.  Your health care provider may prescribe medicine if lifestyle changes are not enough to get your blood pressure under control, and if one of the following is true:  · You are 18-59 years of age and your systolic blood pressure is above 140.  · You are 60 years of age or older, and your systolic blood pressure is above 150.  · Your diastolic blood pressure is above 90.  · You have diabetes, and your systolic blood pressure is over 140 or your diastolic blood pressure is over 90.  · You have kidney disease and your blood pressure is above 140/90.  · You have heart disease and your blood pressure is above 140/90.  Your personal target blood pressure may vary depending on your medical conditions, your age, and other factors.  HOME CARE INSTRUCTIONS  · Have your blood pressure rechecked as directed by your health care provider.    · Take  medicines only as directed by your health care provider. Follow the directions carefully. Blood pressure medicines must be taken as prescribed. The medicine does not work as well when you skip doses. Skipping doses also puts you at risk for problems.  · Do not smoke.    · Monitor your blood pressure at home as directed by your health care provider.   SEEK MEDICAL CARE IF:   · You think you are having a reaction to medicines taken.  · You have recurrent headaches or feel dizzy.  · You have swelling in your ankles.  · You have trouble with your vision.  SEEK IMMEDIATE MEDICAL CARE IF:  · You develop a severe headache or confusion.  · You have unusual weakness, numbness, or feel faint.  · You have severe chest or abdominal pain.  · You vomit repeatedly.  · You have trouble breathing.  MAKE SURE YOU:   · Understand these instructions.  · Will watch your condition.  · Will get help right away if you are not doing well or get worse.     This information is not intended to replace advice given to you by your health care provider. Make sure you discuss any questions you have with your health care provider.     Document Released: 12/18/2006 Document Revised: 05/03/2016 Document Reviewed: 10/10/2014  [employer] Interactive Patient Education ©2017 [employer]

## 2017-12-05 NOTE — TELEPHONE ENCOUNTER
Pt called back again this morning, left a VM stating he is still in severe pain, he states that he is on his way to the office and wants to be seen by .     -Called pt back, no answer. Left detailed VM for him to give me a call back .

## 2017-12-05 NOTE — ED NOTES
Called to CT to inquire about status of pts CT scan. Pt will be next to go     Linnette Merchant RN  12/05/17 4554

## 2017-12-05 NOTE — TELEPHONE ENCOUNTER
It appears this patient is back in the ED today with increased hypertension.  Nothing to do at this point.

## 2017-12-08 ENCOUNTER — OFFICE VISIT (OUTPATIENT)
Dept: NEUROSURGERY | Facility: CLINIC | Age: 57
End: 2017-12-08

## 2017-12-08 VITALS
BODY MASS INDEX: 19.62 KG/M2 | WEIGHT: 125 LBS | DIASTOLIC BLOOD PRESSURE: 90 MMHG | TEMPERATURE: 99 F | OXYGEN SATURATION: 95 % | SYSTOLIC BLOOD PRESSURE: 170 MMHG | HEART RATE: 75 BPM | HEIGHT: 67 IN

## 2017-12-08 DIAGNOSIS — S22.070G CLOSED WEDGE COMPRESSION FRACTURE OF NINTH THORACIC VERTEBRA WITH DELAYED HEALING, SUBSEQUENT ENCOUNTER: Primary | ICD-10-CM

## 2017-12-08 DIAGNOSIS — M80.00XG OSTEOPOROSIS WITH CURRENT PATHOLOGICAL FRACTURE WITH DELAYED HEALING, UNSPECIFIED OSTEOPOROSIS TYPE, SUBSEQUENT ENCOUNTER: ICD-10-CM

## 2017-12-08 DIAGNOSIS — Z98.890 STATUS POST KYPHOPLASTY: ICD-10-CM

## 2017-12-08 DIAGNOSIS — S22.060G CLOSED WEDGE COMPRESSION FRACTURE OF EIGHTH THORACIC VERTEBRA WITH DELAYED HEALING, SUBSEQUENT ENCOUNTER: ICD-10-CM

## 2017-12-08 DIAGNOSIS — S22.49XG CLOSED FRACTURE OF MULTIPLE RIBS WITH DELAYED HEALING, UNSPECIFIED LATERALITY, SUBSEQUENT ENCOUNTER: ICD-10-CM

## 2017-12-08 DIAGNOSIS — F17.200 SMOKING ADDICTION: ICD-10-CM

## 2017-12-08 PROCEDURE — 99024 POSTOP FOLLOW-UP VISIT: CPT | Performed by: PHYSICIAN ASSISTANT

## 2017-12-08 NOTE — PROGRESS NOTES
[facility]    Patient: Rodolfo Hill      Date: 17  : 1960   MRN: [id number]    PCP: Elvis Garcia MD  ______________________________________________________________________      CHIEF COMPLIANT:   Mid back pain s/p kyphoplasties    HISTORY OF PRESENT ILLNESS  Mr. Hill is a 57 y.o.  and heavy smoker with chronic back difficulties and h/o of RA and osteoporosis who presents today for postop follow up.  Pre-operatively, he presented with severe unremitting mid back pain  Studies demonstrated reveals subacute fractures at T8 and T9, much more significant at T9. There was no cord compromise.  Plain films of the back revealed significant wedging at T9 and some wedging of the inferior aspect of T8 as well.  He is developing some degree of kyphosis at this level.  Bone scan demonstrated uptake at both of the T8 and T9 as well as some arthritic changes.  Focal areas of increased tracer uptake was seen in the leftward anterior rib cage which appear to be traumatic.   On 17, he presented for T8 and T9 kyphoplasty.  The nature of the procedure as well as the potential risks, complications, limitations, and alternatives to the procedure were discussed at length with the patient and the patient agreed to proceed with surgery.   He tolerated the procedure well with no overt intraoperative complications.    He presented to the ED on  complaining of left-sided chest and right scapula pain which worsened with coughing.  He also reported that his low and mid back pain increased over his baseline of chronic pain.  CT demonstrated healing non-displaced rib fractures of the head of T8 and T9, healing right transverse process fracture, and bilateral fractures of T9.   CTA was otherwise unremarkable.    Today, he reports he feels worse than before surgery.  He complains of sharp mid back pain. Pain is worsened with movement and breathing.   Pain is improved with pain medicine.  Incisions are healing nicely without wound drainage, redness, swelling, or pain at incision site.      The patient's past medical history, past surgical history, family history, and social history have been reviewed at length in the electronic medical record.      Past Medical History:   Diagnosis Date   • Anxiety    • COPD (chronic obstructive pulmonary disease)    • Depression    • Hyperlipidemia    • Hypertension    • Myocardial infarct    • Osteoporosis    • Rheumatoid arthritis    • Sleep apnea with use of continuous positive airway pressure (CPAP)      Past Surgical History:   Procedure Laterality Date   • CARPAL TUNNEL RELEASE     • KYPHOPLASTY N/A 12/1/2017    Procedure: KYPHOPLASTY T8-9;  Surgeon: Fahad Santana MD;  Location: [facility];  Service:      Social History     Social History   • Marital status: Legally      Spouse name: N/A   • Number of children: N/A   • Years of education: N/A     Occupational History   • Not on file.     Social History Main Topics   • Smoking status: Current Every Day Smoker     Packs/day: 1.50     Years: 40.00     Types: Cigarettes   • Smokeless tobacco: Never Used   • Alcohol use Yes      Comment: rarely   • Drug use: No   • Sexual activity: Defer     Other Topics Concern   • Not on file     Social History Narrative     Mr. Hill has a current medication list which includes the following prescription(s): baclofen, budesonide-formoterol, carvedilol, clotrimazole, diazepam, diclofenac, gabapentin, lisinopril, meloxicam, morphine, nortriptyline, oxycodone-acetaminophen, prednisone, and tiotropium bromide monohydrate. See EMR for details.  He is allergic to alupent [metaproterenol].      Review of Systems   Constitutional: Negative for activity change, appetite change, chills, diaphoresis, fatigue, fever and unexpected weight change.   HENT: Positive for tinnitus. Negative for congestion, dental problem, drooling, ear discharge,  ear pain, facial swelling, hearing loss, mouth sores, nosebleeds, postnasal drip, rhinorrhea, sinus pressure, sneezing, sore throat, trouble swallowing and voice change.    Eyes: Negative.  Negative for photophobia, pain, discharge, redness, itching and visual disturbance.   Respiratory: Positive for apnea, chest tightness and shortness of breath. Negative for cough, choking, wheezing and stridor.    Cardiovascular: Negative for chest pain, palpitations and leg swelling.   Gastrointestinal: Negative.  Negative for abdominal distention, abdominal pain, anal bleeding, blood in stool, constipation, diarrhea, nausea, rectal pain and vomiting.   Endocrine: Positive for polydipsia and polyuria. Negative for cold intolerance.   Genitourinary: Negative for decreased urine volume, difficulty urinating, discharge, dysuria, enuresis, flank pain, frequency, genital sores, hematuria, penile pain, penile swelling, scrotal swelling, testicular pain and urgency.   Musculoskeletal: Positive for arthralgias, back pain, myalgias and neck pain. Negative for gait problem, joint swelling and neck stiffness.   Skin: Negative for color change, pallor, rash and wound.   Allergic/Immunologic: Negative.  Negative for environmental allergies, food allergies and immunocompromised state.   Neurological: Positive for dizziness. Negative for tremors, seizures, syncope, facial asymmetry, speech difficulty, weakness, light-headedness, numbness and headaches.   Hematological: Negative.  Negative for adenopathy. Does not bruise/bleed easily.   Psychiatric/Behavioral: Negative.  Negative for agitation, behavioral problems, confusion, decreased concentration, dysphoric mood, hallucinations, self-injury, sleep disturbance and suicidal ideas. The patient is not nervous/anxious and is not hyperactive.    All other systems reviewed and are negative.       OBJECTIVE  Physical Exam  Vitals: Blood pressure 170/90, pulse 75, temperature 99 °F (37.2 °C),  "temperature source Temporal Artery , height 170.2 cm (67.01\"), weight 56.7 kg (125 lb), SpO2 95 %.  Body mass index is 19.57 kg/(m^2).   General Appearance:   WDWN, alert, and argumentative.   HEENT: NCAT   Mood: Irritable, angry.    Higher Integrative Fx: AAOx3. Speech intact.     Station and Gait: Station and gait are normal.   Skin: Skin incisions are well approximated at appropriate stage of healing without warmth, drainage, or erythema.        Medical Decision Making  Data Review:    See above for review of ED notes and related imaging.   Reviewed imaging and clinical findings with Dr. Santana.   YOHSI reviewed at today's visit and is appropriate.    ASSESSMENT  1. Osteoporosis with T8 and T9 compression fractures s/p kyphoplasty  2. Osteoporosis with delayed healing of eighth and ninth rib fractures  3. Tobacco abuse with further delays bone healing      DISCUSSION  The female who accompanied him in the office today is accusatory and argumentative. She states his pain worsened after surgery and also states that anesthesia told her that a needle could have been lost in his lung.  I informed her that other than the needle that was placed in his upper extremity for anesthesia before surgery that no other [hypodermic] needles come in contact with the thoracic area.  I reiterated that Dr. Santana reviewed the potential risks, complications, limitations, and alternatives to the procedure.  The bone scan prior to surgery showed an intense abnormal tracer uptake in the T8 and T9 level of the midthoracic spine, which includes the head of the right eighth and ninth ribs, right transverse process, and pedicles of T9.  The images on 12/5 show the fractures of the above noted locations to be healing with no evidence of acute trauma.     Mr. Hill was given a prescription for Percocet 7.5 #50 with NR for his pain.  His incision is healing well with no s/s of infection.   Continue supportive measures as needed.   He seemed " happy and content when I informed him that we would renew his pain medication.   He was in good spirits when he left the exam room to check out.  He will return for follow up with Dr. Santana in 1-2 weeks. He should call or RTC sooner if symptoms worsen or new symptoms develop.     PARIS Vargas MD  [facility]  12/08/17

## 2017-12-15 ENCOUNTER — OFFICE VISIT (OUTPATIENT)
Dept: NEUROSURGERY | Facility: CLINIC | Age: 57
End: 2017-12-15

## 2017-12-15 VITALS — WEIGHT: 124 LBS | TEMPERATURE: 98.3 F | BODY MASS INDEX: 19.46 KG/M2 | HEIGHT: 67 IN

## 2017-12-15 DIAGNOSIS — M51.36 DEGENERATIVE DISC DISEASE, LUMBAR: ICD-10-CM

## 2017-12-15 DIAGNOSIS — S22.060D CLOSED WEDGE COMPRESSION FRACTURE OF EIGHTH THORACIC VERTEBRA WITH ROUTINE HEALING, SUBSEQUENT ENCOUNTER: ICD-10-CM

## 2017-12-15 DIAGNOSIS — S22.070D CLOSED WEDGE COMPRESSION FRACTURE OF NINTH THORACIC VERTEBRA WITH ROUTINE HEALING, SUBSEQUENT ENCOUNTER: Primary | ICD-10-CM

## 2017-12-15 PROCEDURE — 99212 OFFICE O/P EST SF 10 MIN: CPT | Performed by: NEUROLOGICAL SURGERY

## 2017-12-15 NOTE — PROGRESS NOTES
"Patient: Rodolfo Hill  : 1960    Primary Care Provider: Elvis Garcia MD    Requesting Provider: As above        History    Chief Complaint: Midback pain.    History of Present Illness: Mr. Hill is a 57-year-old  with chronic back difficulties and a history of rheumatoid arthritis and osteoporosis.  Studies demonstrated subacute fractures at T8 and T9, much more significant at T9.  On 17 he underwent T8 and T9 kyphoplasty.  We are soberly presented to the emergency room with additional complaints.  He has pain in his right shoulder blade area.  He has bilateral \"rib\" pain.  His symptoms are improved when compared to last week when he was here but they persist.  In addition to his usual MS Contin he has gone through 50 Percocet 7.5 pills in the last week.  He is out of his usual Valium.  He continues on baclofen as well.    Review of Systems   Constitutional: Positive for activity change and diaphoresis. Negative for appetite change, chills, fatigue, fever and unexpected weight change.   HENT: Positive for congestion. Negative for dental problem, drooling, ear discharge, ear pain, facial swelling, hearing loss, mouth sores, nosebleeds, postnasal drip, rhinorrhea, sinus pressure, sneezing, sore throat, tinnitus, trouble swallowing and voice change.    Eyes: Negative for photophobia, pain, discharge, redness, itching and visual disturbance.   Respiratory: Positive for apnea and shortness of breath. Negative for cough, choking, chest tightness, wheezing and stridor.    Cardiovascular: Negative for chest pain, palpitations and leg swelling.   Gastrointestinal: Negative for abdominal distention, abdominal pain, anal bleeding, blood in stool, constipation, diarrhea, nausea, rectal pain and vomiting.   Endocrine: Positive for cold intolerance.   Musculoskeletal: Positive for back pain, myalgias, neck pain and neck stiffness. Negative for arthralgias, gait problem and joint swelling.   Skin: " "Negative for color change, pallor, rash and wound.   Allergic/Immunologic: Negative for environmental allergies, food allergies and immunocompromised state.   Neurological: Negative for dizziness, tremors, seizures, syncope, facial asymmetry, speech difficulty, weakness, light-headedness, numbness and headaches.   Hematological: Negative for adenopathy. Does not bruise/bleed easily.   Psychiatric/Behavioral: Negative for agitation, behavioral problems, confusion, decreased concentration, dysphoric mood, self-injury, sleep disturbance and suicidal ideas. The patient is not nervous/anxious and is not hyperactive.        The patient's past medical history, past surgical history, family history, and social history have been reviewed at length in the electronic medical record.    Physical Exam:   Temp 98.3 °F (36.8 °C)  Ht 170.2 cm (67\")  Wt 56.2 kg (124 lb)  BMI 19.42 kg/m2  The patient ambulates independently.  His incision sites are well-healed.  Motor function is intact in his upper and lower extremities.    Medical Decision Making    Data Review:   CT of the thoracic spine demonstrates some healing rib fractures.  Cement is well contained at the T8 and T9 kyphoplasty sites.  Bilateral pedicle fractures at T9, age indeterminate, are noted.      Diagnosis:   Osteoporotic thoracic compression fractures status post kyphoplasty.  The patient has pain that is out of proportion to what we typically see with this procedure.  His symptoms do appear to be dissipating however.    Treatment Options:   The patient will simply need time.  He will follow-up in our clinic in about 3 weeks to check on his progress.  He has gone through his medication far in excess of how it was to be utilized.  I've not provided additional pain medication.  He continues on his MS Contin that is prescribed in a pain clinic setting.       Diagnosis Plan   1. Closed wedge compression fracture of ninth thoracic vertebra with routine healing, " subsequent encounter     2. Closed wedge compression fracture of eighth thoracic vertebra with routine healing, subsequent encounter     3. Degenerative disc disease, lumbar             I, Dr. Santana, personally performed the services described in the documentation, as scribed in my presence, and it is both accurate and complete.  Scribed for Fahad Santana MD by Chin Palomares CMA on 12/15/2017 at 10:47 AM

## 2017-12-18 ENCOUNTER — TELEPHONE (OUTPATIENT)
Dept: NEUROSURGERY | Facility: CLINIC | Age: 57
End: 2017-12-18

## 2017-12-18 NOTE — TELEPHONE ENCOUNTER
Provider:  Max  Caller: Rodolfo Hill  Time of call:   08:45 AM  Phone #:  [phone]  Surgery:  Kypho T8/9  Surgery Date:  12/01/17  Last visit:   12/15/17  Next visit: 01/05/18    YOSHI:       11/28/2017 Oxycodone/Acetaminophen  325MG/10MG 1960 90 30 Fahad Bruno  12/05/2017 Oxycodone/Acetaminophen  325MG/10MG 1960 16 3 Edenilson Gaytan  12/08/2017 Oxycodone/Acetaminophen  325MG/7.5MG 1960 50 17 Fahad Santana    Reason for call:       PT called in to say that he 'didn't tell Dr. Santana the truth about his bladder' and has been having trouble making it out of the bed at night or out of his chair in time and has been having accidents and urinating on himself. Wants to know what to do. Did say he is still having a good amount of pain

## 2017-12-18 NOTE — TELEPHONE ENCOUNTER
Patient reports urinary frequency.  He says this is been going on since his surgery.  On 1 or 2 occasions he has had urinary incontinence because he was unable to make it to the bathroom in time.  He denies perineal sensory alteration.  His bowels are intact.  Patient does not have symptoms of cauda equina and his complaints are unlikely related to his most recent surgery.  I discussed signs and symptoms that would necessitate a follow-up in a more urgent manner.

## 2018-01-05 ENCOUNTER — OFFICE VISIT (OUTPATIENT)
Dept: NEUROSURGERY | Facility: CLINIC | Age: 58
End: 2018-01-05

## 2018-01-05 VITALS
BODY MASS INDEX: 19.93 KG/M2 | WEIGHT: 127 LBS | HEIGHT: 67 IN | DIASTOLIC BLOOD PRESSURE: 80 MMHG | TEMPERATURE: 98.2 F | SYSTOLIC BLOOD PRESSURE: 112 MMHG

## 2018-01-05 DIAGNOSIS — M48.50XG PATHOLOGICAL COMPRESSION FRACTURE OF VERTEBRA WITH DELAYED HEALING, SUBSEQUENT ENCOUNTER: Primary | ICD-10-CM

## 2018-01-05 DIAGNOSIS — Z98.890 STATUS POST KYPHOPLASTY: ICD-10-CM

## 2018-01-05 PROCEDURE — 99213 OFFICE O/P EST LOW 20 MIN: CPT | Performed by: PHYSICIAN ASSISTANT

## 2018-01-05 NOTE — PROGRESS NOTES
[facility]    Patient: Rodolfo Hill      Date: 18  : 1960   MRN: [id number]  ______________________________________________________________________      CHIEF COMPLIANT:   Midback pain    HISTORY OF PRESENT ILLNESS  Mr. Hill is a 57 y.o.  and heavy smoker with chronic back difficulties and h/o of RA and osteoporosis who presents today for follow up s/p T8 and T9 kyphoplasty on 17.   Postoperatively, he presented to ED with additional complaints.  He continued to have pain in his ribs related to healing non-displaced rib fractures of the head of T8 and T9.  His rib and back pain have improved since last visit 3 weeks ago.  His pain is improved with his routinely prescribed MS Contin and from his pain clinic.  He is also prescribed Baclofen,  Gabapentin, and Mobic.  No gait or B/B changes.      The patient's past medical history, past surgical history, family history, and social history have been reviewed at length in the electronic medical record.      Past Medical History:   Diagnosis Date   • Anxiety    • COPD (chronic obstructive pulmonary disease)    • Depression    • Hyperlipidemia    • Hypertension    • Myocardial infarct    • Osteoporosis    • Rheumatoid arthritis    • Sleep apnea with use of continuous positive airway pressure (CPAP)      Past Surgical History:   Procedure Laterality Date   • CARPAL TUNNEL RELEASE     • KYPHOPLASTY N/A 2017    Procedure: KYPHOPLASTY T8-9;  Surgeon: Fahad Santana MD;  Location: [facility];  Service:      Social History     Social History   • Marital status: Legally      Spouse name: N/A   • Number of children: N/A   • Years of education: N/A     Occupational History   • Not on file.     Social History Main Topics   • Smoking status: Current Every Day Smoker     Packs/day: 1.50     Years: 40.00     Types: Cigarettes   • Smokeless tobacco: Never Used   • Alcohol use Yes       Comment: rarely   • Drug use: No   • Sexual activity: Defer     Other Topics Concern   • Not on file     Social History Narrative     Mr. Hill has a current medication list which includes the following prescription(s): baclofen, budesonide-formoterol, carvedilol, clotrimazole, diazepam, diclofenac, gabapentin, lisinopril, meloxicam, morphine, nortriptyline, oxycodone-acetaminophen, prednisone, and tiotropium bromide monohydrate. See EMR for details.  He is allergic to alupent [metaproterenol].      Review of Systems   Constitutional: Positive for activity change and diaphoresis. Negative for appetite change, chills, fatigue, fever and unexpected weight change.   HENT: Positive for congestion. Negative for dental problem, drooling, ear discharge, ear pain, facial swelling, hearing loss, mouth sores, nosebleeds, postnasal drip, rhinorrhea, sinus pressure, sneezing, sore throat, tinnitus, trouble swallowing and voice change.    Eyes: Negative for photophobia, pain, discharge, redness, itching and visual disturbance.   Respiratory: Positive for apnea and shortness of breath. Negative for cough, choking, chest tightness, wheezing and stridor.    Cardiovascular: Negative for chest pain, palpitations and leg swelling.   Gastrointestinal: Negative for abdominal distention, abdominal pain, anal bleeding, blood in stool, constipation, diarrhea, nausea, rectal pain and vomiting.   Endocrine: Positive for cold intolerance.   Musculoskeletal: Positive for back pain, myalgias, neck pain and neck stiffness. Negative for arthralgias, gait problem and joint swelling.   Skin: Negative for color change, pallor, rash and wound.   Allergic/Immunologic: Negative for environmental allergies, food allergies and immunocompromised state.   Neurological: Negative for dizziness, tremors, seizures, syncope, facial asymmetry, speech difficulty, weakness, light-headedness, numbness and headaches.   Hematological: Negative for adenopathy.  "Does not bruise/bleed easily.   Psychiatric/Behavioral: Negative for agitation, behavioral problems, confusion, decreased concentration, dysphoric mood, self-injury, sleep disturbance and suicidal ideas. The patient is not nervous/anxious and is not hyperactive.         OBJECTIVE  Physical Exam  Vitals: Blood pressure 112/80, temperature 98.2 °F (36.8 °C), height 170.2 cm (67\"), weight 57.6 kg (127 lb).  Body mass index is 19.89 kg/(m^2).   General Appearance:   WDWN, alert, and cooperative in NAD   HEENT: NCAT   Chest Breathing unlabored.   Mood: Appropriate mood and affect.   Higher Integrative Fx: AAOx3. Speech intact.     Station and Gait: Station and gait are normal.  Ambulates independently without assistance.   Skin: Skin incision well approximated at appropriate stage of healing without warmth, drainage, or erythema.      Blood pressure 112/80, temperature 98.2 °F (36.8 °C), height 170.2 cm (67\"), weight 57.6 kg (127 lb).  Body mass index is 19.89 kg/(m^2).  The patient is alert, cooperative, and in NAD.  NCAT  Breathing unlabored  Mood and affect appropriate.  Speech intact.  Recent and remote memory intact.  No fasciculations, rigidity, spasticity, or abnormal movements.  He ambulates independently and steady.    Skin warm and dry. Not diaphoretic.       Medical Decision Making  Data Review:    Reviewed clinical findings with Dr. Santana.     ASSESSMENT  Osteoporotic thoracic compression fracture s/p kyphoplasty    DISCUSSION  I reassured Mr. Hill that he will continue to improve with time and supportive measures.  He should continue to advance activity as tolerated.  Return for f/up with me in 1 month which may be final visit. He should call or RTC sooner if symptoms worsen or new symptoms develop.     Yohana Piña PA-C  [facility]  01/05/18    ______________________________________________  Patient Care Team:  Elvis Garcia MD as PCP - General (Family " Medicine)  Fahad Bruno DO as Referring Physician (Pain Medicine)

## 2020-07-17 ENCOUNTER — OFFICE VISIT (OUTPATIENT)
Dept: NEUROSURGERY | Facility: CLINIC | Age: 60
End: 2020-07-17

## 2020-07-17 VITALS — TEMPERATURE: 97.3 F | RESPIRATION RATE: 15 BRPM | HEIGHT: 67 IN | WEIGHT: 129.4 LBS | BODY MASS INDEX: 20.31 KG/M2

## 2020-07-17 DIAGNOSIS — M47.9 SPONDYLOSIS: Primary | ICD-10-CM

## 2020-07-17 DIAGNOSIS — M43.10 ACQUIRED SPONDYLOLISTHESIS: ICD-10-CM

## 2020-07-17 DIAGNOSIS — M50.30 DEGENERATION OF CERVICAL INTERVERTEBRAL DISC: ICD-10-CM

## 2020-07-17 DIAGNOSIS — R29.898 WEAKNESS OF LEFT HAND: ICD-10-CM

## 2020-07-17 DIAGNOSIS — Z71.6 ENCOUNTER FOR SMOKING CESSATION COUNSELING: ICD-10-CM

## 2020-07-17 DIAGNOSIS — M54.10 RADICULOPATHY, UNSPECIFIED SPINAL REGION: ICD-10-CM

## 2020-07-17 PROCEDURE — 99214 OFFICE O/P EST MOD 30 MIN: CPT | Performed by: PHYSICIAN ASSISTANT

## 2020-07-17 NOTE — PROGRESS NOTES
Patient: Rodolfo Hill  : 1960  Chart #: [id number]    Date of Service: 2020    CHIEF COMPLAINT: Neck, left shoulder, and arm pain with weakness in the hand    History of Present Illness Mr. Hill is a 60-year-old  who has a history of rheumatoid arthritis and osteoporosis.  In  he underwent T9 and T8 kyphoplasty for osteoporotic compression fractures.  1 month ago he was walking a horse when it took off running jerking him to the ground.  He went to the emergency room where he was found to have a distal clavicle fracture, some reported rib fractures, and a transverse process fracture at T1 and T2.  Couple days later he developed some burning pain down the left arm to the wrist.  He complains of  strength weakness in the left hand.  He denies right-sided symptoms.  No lower extremity numbness weakness or gait difficulties.  He is treated chronically with Neurontin, oxycodone, diazepam, and NSAIDs for chronic pain.  He has had no recent therapies for his current injury.  He did see orthopedics who recommended conservative management for his distal clavicle fracture.      Past Medical History:   Diagnosis Date   • Anxiety    • COPD (chronic obstructive pulmonary disease) (CMS/[facility])    • Depression    • Hyperlipidemia    • Hypertension    • Myocardial infarct (CMS/[facility])    • Osteoporosis    • Rheumatoid arthritis (CMS/[facility])    • Sleep apnea with use of continuous positive airway pressure (CPAP)          Current Outpatient Medications:   •  baclofen (LIORESAL) 10 MG tablet, Take 20 mg by mouth Every 4 (Four) Hours As Needed., Disp: , Rfl:   •  budesonide-formoterol (SYMBICORT) 160-4.5 MCG/ACT inhaler, Inhale 2 puffs 2 (Two) Times a Day., Disp: , Rfl:   •  carvedilol (COREG) 3.125 MG tablet, Take 3.125 mg by mouth 2 (Two) Times a Day With Meals., Disp: , Rfl:   •  clotrimazole (LOTRIMIN) 1 % cream, Apply 1 application topically 2 (Two) Times a Day., Disp: , Rfl:   •  diazePAM (VALIUM)  5 MG tablet, Take 5 mg by mouth 2 (Two) Times a Day As Needed for Anxiety., Disp: , Rfl:   •  diclofenac (VOLTAREN) 1 % gel gel, Apply 4 g topically 4 (Four) Times a Day As Needed., Disp: , Rfl:   •  gabapentin (NEURONTIN) 600 MG tablet, Take 600 mg by mouth 4 (Four) Times a Day., Disp: , Rfl:   •  lisinopril (PRINIVIL,ZESTRIL) 10 MG tablet, Take 10 mg by mouth Daily., Disp: , Rfl:   •  meloxicam (MOBIC) 15 MG tablet, Take 15 mg by mouth Daily., Disp: , Rfl:   •  nortriptyline (PAMELOR) 25 MG capsule, Take 25 mg by mouth At Night As Needed., Disp: , Rfl:   •  PREDNISONE PO, Take 2 tablets by mouth Daily. Pt unsure of dose., Disp: , Rfl:   •  Tiotropium Bromide Monohydrate (SPIRIVA HANDIHALER IN), Inhale 2 puffs Daily., Disp: , Rfl:     Past Surgical History:   Procedure Laterality Date   • CARPAL TUNNEL RELEASE     • KYPHOPLASTY N/A 12/1/2017    Procedure: KYPHOPLASTY T8-9;  Surgeon: Fahad Santana MD;  Location: [facility];  Service:    • SHOULDER SURGERY Left 2019       Social History     Socioeconomic History   • Marital status: Legally      Spouse name: Not on file   • Number of children: Not on file   • Years of education: Not on file   • Highest education level: Not on file   Tobacco Use   • Smoking status: Current Every Day Smoker     Packs/day: 1.50     Years: 40.00     Pack years: 60.00     Types: Cigarettes   • Smokeless tobacco: Never Used   Substance and Sexual Activity   • Alcohol use: Yes     Comment: rarely   • Drug use: No   • Sexual activity: Defer         Review of Systems   Constitutional: Negative for activity change, appetite change, chills, diaphoresis, fatigue, fever and unexpected weight change.   HENT: Negative for congestion, dental problem, drooling, ear discharge, ear pain, facial swelling, hearing loss, mouth sores, nosebleeds, postnasal drip, rhinorrhea, sinus pressure, sneezing, sore throat, tinnitus, trouble swallowing and voice change.    Eyes: Negative for photophobia,  "pain, discharge, redness, itching and visual disturbance.   Respiratory: Negative for apnea, cough, choking, chest tightness, shortness of breath, wheezing and stridor.    Cardiovascular: Negative for chest pain, palpitations and leg swelling.   Gastrointestinal: Negative for abdominal distention, abdominal pain, anal bleeding, blood in stool, constipation, diarrhea, nausea, rectal pain and vomiting.   Endocrine: Negative for cold intolerance, heat intolerance, polydipsia, polyphagia and polyuria.   Genitourinary: Negative for decreased urine volume, difficulty urinating, dysuria, enuresis, flank pain, frequency, genital sores, hematuria and urgency.   Musculoskeletal: Positive for back pain and neck pain. Negative for arthralgias, gait problem, joint swelling, myalgias and neck stiffness.   Skin: Negative for color change, pallor, rash and wound.   Allergic/Immunologic: Negative for environmental allergies, food allergies and immunocompromised state.   Neurological: Negative for dizziness, tremors, seizures, syncope, facial asymmetry, speech difficulty, weakness, light-headedness, numbness and headaches.   Hematological: Negative for adenopathy. Does not bruise/bleed easily.   Psychiatric/Behavioral: Negative for agitation, behavioral problems, confusion, decreased concentration, dysphoric mood, hallucinations, self-injury, sleep disturbance and suicidal ideas. The patient is not nervous/anxious and is not hyperactive.    All other systems reviewed and are negative.      Objective   Vital Signs: Temperature 97.3 °F (36.3 °C), temperature source Temporal, resp. rate 15, height 170.2 cm (67\"), weight 58.7 kg (129 lb 6.4 oz).  Physical Exam   Constitutional: He appears well-developed and well-nourished. No distress.   HENT:   Head: Normocephalic and atraumatic.   Eyes: EOM are normal.   Cardiovascular: Normal rate and regular rhythm.   Pulmonary/Chest: Effort normal and breath sounds normal.   Psychiatric: He has a " normal mood and affect. His behavior is normal. Thought content normal.   Nursing note and vitals reviewed.  Musculoskeletal:  Limited range of the left shoulder. Mild  weakness on the left compared to right.  Station and gait are normal.  Neurologic:  Muscle tone is normal throughout.  Coordination is intact.  Deep tendon reflexes: absent in the lower extremities.   Sensation is intact to light touch throughout.  Patient is oriented to person, place, and time.  Julian sign negative.     Independent review of radiographic imaging: MRI of the cervical spine demonstrates multilevel degenerative disc disease.  There is an anterior listhesis of C6 on 7.  No abnormal cord signal.  CT of the thoracic spine demonstrates evidence of cement from kyphoplasty at T8 and T9.  Fractures at the left T1 and T2 transverse process. Also reviewed by Dr Santana    Assessment/Plan   Diagnosis: Cervical spondylosis with left arm pain and weakness after recent injury while walking a horse     Medical Decision Making: I have ordered flexion/extension plain films to check for instability, CT of the cervical spine to make sure she does not have a traumatic fracture at C6-7, and an EMG/NVC study of the left upper extremity.  My suspect is that the findings on his cervical MRI are chronic degenerative changes. He will follow up in our office once imaging is complete.        Rodolfo was seen today for neck & back pain.    Diagnoses and all orders for this visit:    Spondylosis  -     XR spine cervical flex and ext only; Future  -     CT Cervical Spine Without Contrast; Future  -     EMG & Nerve Conduction Test; Future    Degeneration of cervical intervertebral disc  -     XR spine cervical flex and ext only; Future  -     CT Cervical Spine Without Contrast; Future  -     EMG & Nerve Conduction Test; Future    Radiculopathy, unspecified spinal region  -     XR spine cervical flex and ext only; Future  -     CT Cervical Spine Without  Contrast; Future  -     EMG & Nerve Conduction Test; Future    Weakness of left hand  -     XR spine cervical flex and ext only; Future  -     CT Cervical Spine Without Contrast; Future  -     EMG & Nerve Conduction Test; Future    Acquired spondylolisthesis      I discussed >3m the need to STOP smoking, there is a direct correlation between smoking and wound healing and degenerative disc disease. Patient will take this under advisement and discuss with their primary provider.         Kate Manning PA-C  Patient Care Team:  Elvis Garcia MD as PCP - General (Family Medicine)  Fahad Bruno DO as Referring Physician (Pain Medicine)  Clayton Crawford MD (Orthopedic Surgery)

## 2020-08-04 ENCOUNTER — PROCEDURE VISIT (OUTPATIENT)
Dept: NEUROLOGY | Facility: CLINIC | Age: 60
End: 2020-08-04

## 2020-08-04 ENCOUNTER — HOSPITAL ENCOUNTER (OUTPATIENT)
Dept: CT IMAGING | Facility: HOSPITAL | Age: 60
Discharge: HOME | End: 2020-08-04
Admitting: PHYSICIAN ASSISTANT

## 2020-08-04 ENCOUNTER — OFFICE VISIT (OUTPATIENT)
Dept: NEUROSURGERY | Facility: CLINIC | Age: 60
End: 2020-08-04

## 2020-08-04 VITALS
BODY MASS INDEX: 19.78 KG/M2 | DIASTOLIC BLOOD PRESSURE: 66 MMHG | HEIGHT: 67 IN | TEMPERATURE: 97.3 F | WEIGHT: 126 LBS | SYSTOLIC BLOOD PRESSURE: 126 MMHG

## 2020-08-04 VITALS
HEIGHT: 67 IN | BODY MASS INDEX: 19.46 KG/M2 | WEIGHT: 124 LBS | OXYGEN SATURATION: 98 % | DIASTOLIC BLOOD PRESSURE: 72 MMHG | SYSTOLIC BLOOD PRESSURE: 118 MMHG | TEMPERATURE: 98 F | HEART RATE: 90 BPM

## 2020-08-04 DIAGNOSIS — S14.3XXA INJURY OF BRACHIAL PLEXUS, INITIAL ENCOUNTER: ICD-10-CM

## 2020-08-04 DIAGNOSIS — M54.10 RADICULOPATHY, UNSPECIFIED SPINAL REGION: ICD-10-CM

## 2020-08-04 DIAGNOSIS — M43.10 ACQUIRED SPONDYLOLISTHESIS: ICD-10-CM

## 2020-08-04 DIAGNOSIS — M47.9 SPONDYLOSIS: ICD-10-CM

## 2020-08-04 DIAGNOSIS — M50.30 DEGENERATION OF CERVICAL INTERVERTEBRAL DISC: ICD-10-CM

## 2020-08-04 DIAGNOSIS — R29.898 WEAKNESS OF LEFT HAND: ICD-10-CM

## 2020-08-04 DIAGNOSIS — M47.9 SPONDYLOSIS: Primary | ICD-10-CM

## 2020-08-04 DIAGNOSIS — S14.3XXA INJURY OF BRACHIAL PLEXUS, INITIAL ENCOUNTER: Primary | ICD-10-CM

## 2020-08-04 DIAGNOSIS — Z71.6 ENCOUNTER FOR SMOKING CESSATION COUNSELING: ICD-10-CM

## 2020-08-04 PROCEDURE — 72125 CT NECK SPINE W/O DYE: CPT

## 2020-08-04 PROCEDURE — 95886 MUSC TEST DONE W/N TEST COMP: CPT | Performed by: PSYCHIATRY & NEUROLOGY

## 2020-08-04 PROCEDURE — 99214 OFFICE O/P EST MOD 30 MIN: CPT | Performed by: PHYSICIAN ASSISTANT

## 2020-08-04 PROCEDURE — 95909 NRV CNDJ TST 5-6 STUDIES: CPT | Performed by: PSYCHIATRY & NEUROLOGY

## 2020-08-04 NOTE — PROGRESS NOTES
Patient: Rodolfo Hill  : 1960  Chart #: [id number]    Date of Service: 2020    CHIEF COMPLAINT: Neck, left shoulder, and arm pain with weakness in the hand     History of Present Illness Mr. Hill is seen in follow up. He is a 60-year-old  who has a history of rheumatoid arthritis and osteoporosis.  In  he underwent T9 and T8 kyphoplasty for osteoporotic compression fractures.  1 month ago he was walking a horse when it took off running jerking him to the ground.  He went to the emergency room where he was found to have a distal clavicle fracture, some reported rib fractures, and a transverse process fracture at T1 and T2.  Couple days later he developed some burning pain down the left arm to the wrist.  He complains of  strength weakness in the left hand.  He denies right-sided symptoms.  No lower extremity numbness weakness or gait difficulties.  He is treated chronically with Neurontin, oxycodone, diazepam, and NSAIDs for chronic pain.  He has had no recent therapies for his current injury.  He did see orthopedics who recommended conservative management for his distal clavicle fracture.  Today he says his arm pain is improved but he continues with bothersome neck pain and weakness in his hand.       Past Medical History:   Diagnosis Date   • Anxiety    • COPD (chronic obstructive pulmonary disease) (CMS/[facility])    • Depression    • Hyperlipidemia    • Hypertension    • Myocardial infarct (CMS/[facility])    • Osteoporosis    • Rheumatoid arthritis (CMS/[facility])    • Sleep apnea with use of continuous positive airway pressure (CPAP)          Current Outpatient Medications:   •  baclofen (LIORESAL) 10 MG tablet, Take 20 mg by mouth Every 4 (Four) Hours As Needed., Disp: , Rfl:   •  budesonide-formoterol (SYMBICORT) 160-4.5 MCG/ACT inhaler, Inhale 2 puffs 2 (Two) Times a Day., Disp: , Rfl:   •  carvedilol (COREG) 3.125 MG tablet, Take 3.125 mg by mouth 2 (Two) Times a Day With Meals.,  "Disp: , Rfl:   •  clotrimazole (LOTRIMIN) 1 % cream, Apply 1 application topically 2 (Two) Times a Day., Disp: , Rfl:   •  diazePAM (VALIUM) 5 MG tablet, Take 5 mg by mouth 2 (Two) Times a Day As Needed for Anxiety., Disp: , Rfl:   •  diclofenac (VOLTAREN) 1 % gel gel, Apply 4 g topically 4 (Four) Times a Day As Needed., Disp: , Rfl:   •  gabapentin (NEURONTIN) 600 MG tablet, Take 600 mg by mouth 4 (Four) Times a Day., Disp: , Rfl:   •  lisinopril (PRINIVIL,ZESTRIL) 10 MG tablet, Take 10 mg by mouth Daily., Disp: , Rfl:   •  meloxicam (MOBIC) 15 MG tablet, Take 15 mg by mouth Daily., Disp: , Rfl:   •  nortriptyline (PAMELOR) 25 MG capsule, Take 25 mg by mouth At Night As Needed., Disp: , Rfl:   •  PREDNISONE PO, Take 2 tablets by mouth Daily. Pt unsure of dose., Disp: , Rfl:   •  Tiotropium Bromide Monohydrate (SPIRIVA HANDIHALER IN), Inhale 2 puffs Daily., Disp: , Rfl:     Past Surgical History:   Procedure Laterality Date   • CARPAL TUNNEL RELEASE     • KYPHOPLASTY N/A 12/1/2017    Procedure: KYPHOPLASTY T8-9;  Surgeon: Fahad Santana MD;  Location: [facility];  Service:    • SHOULDER SURGERY Left 2019       Social History     Socioeconomic History   • Marital status: Legally      Spouse name: Not on file   • Number of children: Not on file   • Years of education: Not on file   • Highest education level: Not on file   Tobacco Use   • Smoking status: Current Every Day Smoker     Packs/day: 1.50     Years: 40.00     Pack years: 60.00     Types: Cigarettes   • Smokeless tobacco: Never Used   Substance and Sexual Activity   • Alcohol use: Yes     Comment: rarely   • Drug use: No   • Sexual activity: Defer         Review of Systems   Musculoskeletal: Positive for myalgias.   Neurological: Positive for weakness.   All other systems reviewed and are negative.      Objective   Vital Signs: Blood pressure 126/66, temperature 97.3 °F (36.3 °C), height 170.2 cm (67\"), weight 57.2 kg (126 lb).  Physical " Exam  Constitutional: He appears well-developed and well-nourished. No distress.   HENT:   Head: Normocephalic and atraumatic.   Eyes: EOM are normal.   Cardiovascular: Normal rate and regular rhythm.   Pulmonary/Chest: Effort normal and breath sounds normal.   Psychiatric: He has a normal mood and affect. His behavior is normal. Thought content normal.   Nursing note and vitals reviewed.  Musculoskeletal:  Limited range of the left shoulder. Mild  weakness on the left compared to right.  Station and gait are normal.  Neurologic:  Muscle tone is normal throughout.  Coordination is intact.  Deep tendon reflexes: absent in the lower extremities.   Sensation is intact to light touch throughout.  Patient is oriented to person, place, and time.  Julian sign negative.      Independent review of radiographic imaging: MRI of the cervical spine demonstrates multilevel degenerative disc disease.  There is an anterior listhesis of C6 on 7.  No abnormal cord signal.  CT of the thoracic spine demonstrates evidence of cement from kyphoplasty at T8 and T9.  Fractures at the left T1 and T2 transverse process. Also reviewed by Dr Santana     CT of the cervical spine demonstrates grade 1 or nearly grade 2 anterior subluxation of C6 on C7 felt to be chronic and associated with advanced degenerative disc disease.  No evidence to suggest acute cervical spine trauma.    EMG/NCV studies demonstrate injury to the lower trunk of the left brachial plexus and mild median neuropathy at the wrist.       Assessment/Plan   Diagnosis:   1.  Cervical degenerative disc and joint disease  2.  Left brachial plexus injury   3.  T1 and T2 transverse process fracture    Medical Decision Making: There is no role for surgical intervention.  I do not think he harbors a cervical radiculopathy.  His increased pain is referrable to acute T1 and T2 transverse process fractures which will take time to heal.  He may need a temporary increase in his chronic  pain medication.  I have offered to send him to formal physical therapy for his left hand weakness likely due to noted brachial plexus injury.  He is not particularly interested at this time.  He will follow-up in our clinic as needed.          Rodolfo was seen today for post-op.    Diagnoses and all orders for this visit:    Spondylosis    Degeneration of cervical intervertebral disc    Weakness of left hand    Acquired spondylolisthesis    Encounter for smoking cessation counseling    Injury of brachial plexus, initial encounter                   I discussed >3m the need to STOP smoking, there is a direct correlation between smoking and wound healing and degenerative disc disease. Patient will take this under advisement and discuss with their primary provider.        Kate Manning PA-C  Patient Care Team:  Elvis Garcia MD as PCP - General (Family Medicine)  Fahad Bruno DO as Referring Physician (Pain Medicine)  Clayton Crawford MD (Orthopedic Surgery)

## 2020-08-04 NOTE — PROGRESS NOTES
[facility]   Electrodiagnostic Laboratory    Nerve Conduction & EMG Report        Patient:  Rodolfo Hill   Patient ID: [id number]   YOB: 1960  Sex:  male      Exam Physician:Larry Maddox MD  Refer Physician:  Kate Manning        Electromyogram and Nerve Conduction Velocity Procedure Note    Hx: 60 y.o. right handed male with complaint of pain involving the left arm and weakness involving the left arm. Symptoms have been present for a few months and were provoked by trauma from a horse jerking him to the ground. Significant past medical history includes RA, osteoporosis, T8 and 9 kyphoplasty, distal clavicle fx, transverse process fx T1 and T2.  Medications include oxycodone, NSAIDS, diazepam. Family history no family history of nerve or muscle disease.    Exam: Motor power is 3/5 intrinsic muscles of L hand. There is no atrophy. There are no fasciculations. Deep tendon reflexes are hypoactive. Sensory exam is decreased in hand and fingers.      Edx studies of the left upper extremity were performed to evaluate for cervical radiculopathy vs brachial plexus injury.      NCS Examination   For sensory nerve conduction studies, the amplitude is measured peak-to-peak, the latency reported is the distal peak latency, and the conduction velocity, if measured, is determined from onset latencies and is over the forearm.   For motor nerve conduction studies, the amplitude is measured baseline-to-peak, the latency reported is the distal onset latency, the conduction velocity is calculated over the forearm, and the F wave latency is the minimum latency.   Unless otherwise noted, the hand temperature was monitored continuously and remained between 32°C and 36°C during the performance of the NCSs.        Sensory NCS      Nerve / Sites Rec. Site Onset Lat Peak Lat NP Amp PP Amp Segments Distance Velocity     ms ms µV µV  cm m/s   L MEDIAN - Dig II Antidr      Wrist Dig II 3.50 4.35  10.2 13.9 Wrist - Dig II 14 40.0      Ref.   3.70 20.0  Ref.     L ULNAR - Dig V Antidr      Wrist Dig V 2.65 3.40 9.6 4.9 Wrist - Dig V 14 52.8      Ref.   3.70 10.0  Ref.     L RADIAL - Thumb Antidr      Forearm Thumb 1.95 2.60 2.3 11.0 Forearm - Thumb 10 51.3      Ref.   3.00 8.0  Ref.               Motor NCS      Nerve / Sites Rec. Site Lat Amp Seq Amp Segments Dist Velocity     ms mV %  cm m/s   L MEDIAN - APB      Wrist APB 4.45 4.7 100 Wrist - APB 8       Ref.  4.40 4.0  Ref.        Elbow APB 9.15 4.2 90.6 Elbow - Wrist 25 53.2      Ref.     Ref.  49.0   L ULNAR - ADM      Wrist ADM 3.80 7.3 100 Wrist - ADM 8       Ref.  3.90 5.0  Ref.        B.Elbow ADM 8.10 7.4 100 B.Elbow - Wrist 24 55.8      Ref.     Ref.  49.0      A.Elbow ADM 9.65 7.5 101 A.Elbow - B.Elbow 9 58.1           F  Wave      Nerve Fmin    ms   L MEDIAN 32.55   REF 33.00   L ULNAR 33.45   REF 33.00             EMG Summary Table     Spontaneous MUAP Recruitment    IA Fib PSW Fasc H.F. Amp Dur. PPP Pattern   L. DELTOID N None None None None N N N N   L. BICEPS N None None None None N N N N   L. TRICEPS N None None None None N N N N   L. PRON TERES N None None None None N N N N   L. FLEX CARPI ULN N None None None None N N N N   L. FIRST D INTEROSS 2+ 2+ 2+ None None 1+ N N Reduced   L. ABD DIG MIN (UL) 2+ 2+ 2+ None None 1+ N N Reduced   L. ABD POLL BREVIS 2+ 2+ 2+ None None 1+ N N Reduced             EMG Examination   The study was performed with a concentric needle electrode. Fibrillation and fasciculation activity is graded from none (0) to continuous (4+). The configuration and recruitment pattern of motor unit action potentials under voluntary control, if not normal, are described below    EMG Summary Table     Spontaneous MUAP Recruitment    IA Fib PSW Fasc H.F. Amp Dur. PPP Pattern   L. DELTOID N None None None None N N N N   L. BICEPS N None None None None N N N N   L. TRICEPS N None None None None N N N N   L. PRON TERES N None None  None None N N N N   L. FLEX CARPI ULN N None None None None N N N N   L. FIRST D INTEROSS 2+ 2+ 2+ None None 1+ N N Reduced   L. ABD DIG MIN (UL) 2+ 2+ 2+ None None 1+ N N Reduced   L. ABD POLL BREVIS 2+ 2+ 2+ None None 1+ N N Reduced         · Left radial sensory responses were abnormal with decreased amplitudes  · Left median motor responses were prolonged at the wrist, and F waves were normal.  · Left median sensory responses were abnormal with prolonged peak latency and decreased amplitudes  · Left ulnar motor responses are normal and F wave were prolonged .  · Left ulnar sensory responses were abnormal with decreased amplitude  · Needle examination with a concentric needle electrode of selected muscles of the left upper were abnormal with acute and chronic axonal loss in the intrinsic muscles of the left hand.     Conclusion: This study showed neurophysiologic evidence of an axonal injury to the lower trunk of the left brachial plexus and mild median neuropathy at with wrist, ie carpal tunnel syndrome.       Instrument used:  Teca Synergy        Performed by:          Larry Maddox MD

## 2021-10-21 NOTE — PROGRESS NOTES
"Chief Complaint: \"Neck pain and headaches.\"          History of Present Illness:   Patient: Mr. Rodolfo Hill, 61 y.o. male   Referring Physician: Dr. Elvis Garcia  Reason for Referral: Consultation for chronic intractable neck pain.   Pain History: Patient reports a longstanding history or chronic posterior neck pain and right shoulder pain, which began without incident.  Patient is a  with a history of rheumatoid arthritis or osteoporosis.  In 2007, he underwent T8 and T9 kyphoplasty for treatment of compression fractures.  For the past years, he had another injury.  He went to the emergency room and was found to have a distal clavicle fracture, some rib fractures, and a transverse process fracture of T1 and T2.  During that time, he developed some left upper extremity pain.  An EMG/NCV was suspicious for left brachial plexopathy and left carpal tunnel syndrome.  Patient denies any pain in his left upper extremity.  He also denies pain in his right upper extremity other than pain that radiates from his back shoulder.  He has been told that he needs a right shoulder arthroplasty.  He denies radicular symptoms, or any signs or symptoms of myelopathy.  He has been chronically prescribed gabapentin, oxycodone, diazepam, muscle relaxers by his primary care physician.  He has also been under the care of Dr. Hercules in [place].  MRI of the cervical spine revealed multilevel degenerative disc and facet joint disease.  There is anterolisthesis of C6 on C7.  Cord signal is preserved.  EMG/NCV of the upper extremities revealed some lower trunk left brachial plexus plexopathy and left median neuropathy at the wrist without correlation with current symptoms.  MRI of the cervical spine revealed multilevel degenerative disc and facet joint disease without critical areas of stenosis.  Patient reports that he had injections with Dr Helms without relief. It is unclear if he has been discharged by , " "[facility]. He also reports that he was told by \"his bone doctor not to have more than 2 injections per year.\" Patient reports predominantly axial mechanical neck pain and occipital headaches. He denies left arm pain despite EMG/NCV findings. He reports pain in his right arm from a \"bad shoulder that needs replacement.\" Patient has failed to obtain pain relief with conservative measures including oral analgesics, physical therapy, to name a few. Rodolfo Hill underwent neurosurgical consultation with Dr. Santana and Kate Manning PA-C on 8/4/2020, and was found not to be a surgical candidate. Pain has progressed in intensity over the past years.   Pain Description: Constant posterior neck pain with intermittent exacerbation, described as aching, burning and stabbing sensation.   Radiation of Pain: The pain radiates into the occipital region and the shoulder blades  Pain intensity today: 9/10  Average pain intensity last week: 8/10  Pain intensity ranges from: 5/10 to 10/10  Aggravating factors: Pain increases with extension, rotation of the cervical spine   Alleviating factors: Pain decreases with rest and analgesics  Associated Symptoms:   Patient denies pain, numbness, or weakness in the upper extremities except for shoulder pain contributing to some weakness in his right arm.   Patient denies any new bladder or bowel problems.   Patient denies difficulties with his balance or recent falls.   Patient reports daily bilateral cervicogenic and occipital headaches lasting several hours.    Review of previous therapies and additional medical records:  Rodolfo Hill has already failed the following measures, including:   Conservative Measures: Oral analgesics, opioids, topical analgesics, ice, heat, chiropractic therapy, physical therapy   Interventional Measures: Injections with Dr. Helms. Records are not available for review  Surgical Measures: No history of previous cervical spine. History of " left rotator cuff repairs  Rodolfo Hill underwent neurosurgical consultation with Dr. Santana and Kate Manning PA-C on 8/4/2020, and was found not to be a surgical candidate.  Rodolfo Hill presents with significant comorbidities including anxiety, depression, COPD, hyperlipidemia, hypertension, history of MI, osteoporosis, rheumatoid arthritis, sleep apnea on CPAP  In terms of current analgesics, Rodolfo Hill takes: Baclofen, diclofenac gel, gabapentin, meloxicam, nortriptyline, oxycodone.  Patient also takes diazepam on medications as prescribed by his primary care physician  I have reviewed [facility] Report #[id number] (diazepam, oxycodone, gabapentin by Dr. Elvis Garcia) consistent with medication reconciliation.  SOAPP: Not completed by the patient    Global Pain Scale 10-28  2021          Pain 21          Feelings 7          Clinical outcomes 20          Activities 8          GPS Total: 56            Review of Diagnostic Studies:  I have reviewed the images with the patient as well as the reports, as follows;  EMG/NCV by Dr. Larry Maddox on 8/4/2020.  Evidence of an axonal injury to the lower trunk of the left brachial plexus and mild median neuropathy at the wrist  CT cervical spine without contrast 8/4/2020.  Grade 1 early grade 2 subluxation of C6 and C7 with advanced degenerative disc and facet joint disease.  Vertebral body heights and disc spaces are maintained.  Moderate atlantoaxial degenerative joint disease.  The dens and the ring of C1 are normally aligned.  Moderate levoconvex scoliosis.  Axial images: Broad-based disc protrusion at C6-7 with mild to moderate canal stenosis.  No evidence of high-grade stenosis elsewhere.  MRI of the cervical spine without contrast on 7/2/2020.  Vertebral body heights are maintained.  Axial imaging:  C3-C4: Disc osteophyte complex, facet arthropathy.  Mild canal and mild left foraminal stenosis  C4-C5: Disc osteophyte complex, facet  arthropathy, mild to moderate canal stenosis and mild left and moderate right foraminal stenosis  C5-C6: Posterior disc osteophyte complex, facet hypertrophy.  Mild canal stenosis and moderate left and severe right foraminal stenosis  C6-C7: Anterolisthesis, broad-based disc osteophyte complex, facet hypertrophy contributing to moderate spinal canal stenosis and severe bilateral neuroforaminal stenosis  C7-T1: No significant canal or foraminal stenosis    Review of Systems   Musculoskeletal: Positive for arthralgias, neck pain and neck stiffness.   All other systems reviewed and are negative.        Patient Active Problem List   Diagnosis   • Pathologic compression fracture of vertebra (HCC)   • Brachial plexus injury   • Cervical spondylosis without myelopathy   • DDD (degenerative disc disease), cervical   • Cervical spinal stenosis   • Rheumatoid arthritis involving multiple sites ([facility])   • Gait disturbance   • Anxiety and depression   • Current every day smoker   • Encounter for smoking cessation counseling   • Occipital neuralgia   • Physical deconditioning       Past Medical History:   Diagnosis Date   • Anxiety    • COPD (chronic obstructive pulmonary disease) ([facility])    • Depression    • Hyperlipidemia    • Hypertension    • Myocardial infarct ([facility])    • Osteoporosis    • Rheumatoid arthritis ([facility])    • Sleep apnea with use of continuous positive airway pressure (CPAP)          Past Surgical History:   Procedure Laterality Date   • CARPAL TUNNEL RELEASE     • KYPHOPLASTY N/A 12/1/2017    Procedure: KYPHOPLASTY T8-9;  Surgeon: Fahad Santana MD;  Location: [facility];  Service:    • SHOULDER SURGERY Left 2019         Family History   Problem Relation Age of Onset   • Cancer Father          Social History     Socioeconomic History   • Marital status: Legally    Tobacco Use   • Smoking status: Current Every Day Smoker     Packs/day: 1.50     Years: 40.00     Pack years: 60.00     Types: Cigarettes   •  Smokeless tobacco: Never Used   Substance and Sexual Activity   • Alcohol use: Yes     Comment: rarely   • Drug use: No   • Sexual activity: Defer           Current Outpatient Medications:   •  alendronate (FOSAMAX) 70 MG tablet, , Disp: , Rfl:   •  Aspirin Low Dose 81 MG EC tablet, , Disp: , Rfl:   •  baclofen (LIORESAL) 20 MG tablet, , Disp: , Rfl:   •  budesonide-formoterol (SYMBICORT) 160-4.5 MCG/ACT inhaler, Inhale 2 puffs 2 (Two) Times a Day., Disp: , Rfl:   •  carvedilol (COREG) 3.125 MG tablet, Take 3.125 mg by mouth 2 (Two) Times a Day With Meals., Disp: , Rfl:   •  clotrimazole (LOTRIMIN) 1 % cream, Apply 1 application topically 2 (Two) Times a Day., Disp: , Rfl:   •  [employer] D3 125 MCG (5000 UT) capsule capsule, , Disp: , Rfl:   •  diazePAM (VALIUM) 5 MG tablet, Take 5 mg by mouth 2 (Two) Times a Day As Needed for Anxiety., Disp: , Rfl:   •  Dulera 200-5 MCG/ACT inhaler, , Disp: , Rfl:   •  folic acid (FOLVITE) 1 MG tablet, , Disp: , Rfl:   •  gabapentin (NEURONTIN) 600 MG tablet, Take 600 mg by mouth 4 (Four) Times a Day., Disp: , Rfl:   •  lisinopril (PRINIVIL,ZESTRIL) 10 MG tablet, Take 10 mg by mouth Daily., Disp: , Rfl:   •  meloxicam (MOBIC) 15 MG tablet, Take 15 mg by mouth Daily., Disp: , Rfl:   •  methotrexate 2.5 MG tablet, , Disp: , Rfl:   •  oxyCODONE (ROXICODONE) 10 MG tablet, , Disp: , Rfl:   •  pantoprazole (PROTONIX) 40 MG EC tablet, , Disp: , Rfl:   •  Tiotropium Bromide Monohydrate (SPIRIVA HANDIHALER IN), Inhale 2 puffs Daily., Disp: , Rfl:   •  baclofen (LIORESAL) 10 MG tablet, Take 20 mg by mouth Every 4 (Four) Hours As Needed., Disp: , Rfl:   •  diclofenac (VOLTAREN) 1 % gel gel, Apply 4 g topically 4 (Four) Times a Day As Needed., Disp: , Rfl:   •  nortriptyline (PAMELOR) 25 MG capsule, Take 25 mg by mouth At Night As Needed., Disp: , Rfl:   •  PREDNISONE PO, Take 2 tablets by mouth Daily. Pt unsure of dose., Disp: , Rfl:       Allergies   Allergen Reactions   • Alupent [Metaproterenol]  "Arrhythmia     Chest pain, increased heart rate.         /78 (BP Location: Left arm, Patient Position: Sitting, Cuff Size: Adult)   Pulse 71   Temp 97.5 °F (36.4 °C)   Ht 167.6 cm (66\")   Wt 53.4 kg (117 lb 12.8 oz)   SpO2 96%   BMI 19.01 kg/m²       Physical Exam:  Constitutional: Patient appears underweight, appears stated age  HEENT: Head: Normocephalic and atraumatic  Eyes: Conjunctivae and lids are normal  Pupils: Equal, round, reactive to light  Neck: Trachea normal. Neck supple. No JVD present.   Lymphatic: No cervical adenopathy  Musculoskeletal   Gait and station: Gait evaluation demonstrated a normal gait with a poor posture.  Able to walk on heels and toes.  Able to do tandem walking.  Cervical spine: Passive and active range of motion are limited secondary to pain. Extension, lateral flexion, rotation of the cervical spine increased and reproduced pain. Cervical facet joint loading maneuvers are positive.  Muscles: Presence of active trigger points at the levator scapulae   Shoulders: The range of motion of the left glenohumeral joint is grossly preserved.  Rotator cuff strength is 5/5.  On the right, patient declined examination.  He exhibits a limited active range of motion   Neurological:   Patient is alert and oriented to person, place, and time.   Speech: Normal.   Cortical function: Normal mental status.   Reflex Scores:  Right brachioradialis: 2+  Left brachioradialis: 2+  Right biceps: 2+  Left biceps: 2+  Right triceps: 2+  Left triceps: 2+  Right patellar: 1+  Left patellar: 1+  Right Achilles: 1+  Left Achilles: 1+  Motor strength: 5/5  Motor Tone: Normal   Involuntary movements: None.   Superficial/Primitive Reflexes: Primitive reflexes were absent.   Right Julian: Absent  Left Julian: Absent  Right ankle clonus: Absent  Left ankle clonus: Absent   Babinsky: Absent  Spurling sign: Negative. Neck tornado test: Negative. Lhermitte sign: Negative. Long tract signs: Negative. "   Sensory exam: Intact to light touch, intact pain and temperature sensation, intact vibration sensation and normal proprioception.   Coordination: Normal finger to nose and heel to shin. Normal balance and negative Romberg's sign   Skin and subcutaneous tissue: Skin is warm and intact. No rash noted. No cyanosis.   Psychiatric: Judgment and insight: Normal. Recent and remote memory: Intact. Mood and affect: Normal.     ASSESSMENT:   1. Cervical spondylosis without myelopathy    2. DDD (degenerative disc disease), cervical    3. Cervical spinal stenosis    4. Occipital neuralgia, unspecified laterality    5. Rheumatoid arthritis involving multiple sites, unspecified whether rheumatoid factor present (HCC)    6. Gait disturbance    7. Physical deconditioning    8. Anxiety and depression    9. Current every day smoker    10. Encounter for smoking cessation counseling        PLAN/MEDICAL DECISION MAKING: Patient reports a longstanding history or chronic posterior neck pain and right shoulder pain, which began without incident.  Patient is a  with a history of rheumatoid arthritis or osteoporosis.  In 2007, he underwent T8 and T9 kyphoplasty for treatment of compression fractures.  For the past years, he had another injury.  He went to the emergency room and was found to have a distal clavicle fracture, some rib fractures, and a transverse process fracture of T1 and T2.  During that time, he developed some left upper extremity pain.  An EMG/NCV was suspicious for left brachial plexopathy and left carpal tunnel syndrome.  Patient denies any pain in his left upper extremity.  He also denies pain in his right upper extremity other than pain that radiates from his back shoulder.  He has been told that he needs a right shoulder arthroplasty.  He denies radicular symptoms, or any signs or symptoms of myelopathy.  He has been chronically prescribed gabapentin, oxycodone, diazepam, muscle relaxers by his primary  "care physician.  He has also been under the care of Dr. Hercules in [place].  MRI of the cervical spine revealed multilevel degenerative disc and facet joint disease.  There is anterolisthesis of C6 on C7.  Cord signal is preserved.  EMG/NCV of the upper extremities revealed some lower trunk left brachial plexus plexopathy and left median neuropathy at the wrist without correlation with current symptoms.  MRI of the cervical spine revealed multilevel degenerative disc and facet joint disease without critical areas of stenosis.  Patient reports that he had injections with Dr Helms without relief. It is unclear if he has been discharged by [facility] Dempsey. He also reports that he was told by \"his bone doctor not to have more than 2 injections per year.\" Patient reports predominantly axial mechanical neck pain and occipital headaches. He denies left arm pain despite EMG/NCV findings. He reports pain in his right arm from a \"bad shoulder that needs replacement.\" Patient has failed to obtain pain relief with conservative measures including oral analgesics, physical therapy, to name a few. Rodolfo Hlil underwent neurosurgical consultation with Dr. Santana and Kate Manning PA-C on 8/4/2020, and was found not to be a surgical candidate. Pain has progressed in intensity over the past years.  Patient has failed to obtain pain relief with conservative measures and previous interventional pain management measures, as referenced above. I have reviewed all available patient's medical records as well as previous therapies as referenced above. I had a lengthy conversation with Mr. Rodolfo Hill regarding his chronic pain condition and potential therapeutic options including risks, benefits, alternative therapies, to name a few. Therefore, I have proposed the following plan:  1. Diagnostic studies: None indicated at this time.  I would recommend frequent and random urine drug screenings to assess " compliance.  His medications are prescribed by his primary care physician.  2. Pharmacological measures: Reviewed and discussed; Patient takes baclofen, diclofenac gel, gabapentin, meloxicam, nortriptyline, oxycodone.  Patient also takes diazepam on medications as prescribed by his primary care physician.  I have no additional recommendations to make for his analgesic regimen other than to continue measures to determine appropriate compliance with treatment  3. Interventional pain management measures: Patient would like to consider this option.  Specifically, I have discussed with the patient the possibility of diagnostic bilateral cervical medial branch blocks at C5, C6, C7; for bilateral cervical facet joints at C5-C6, C6-C7 to clarify the origin of chronic refractory mechanical/axial cervicalgia. If patient experiences more than 80% pain relief along with significant improvement in the range of motion of the cervical spine, then, patient will be scheduled for a second set of diagnostic bilateral cervical medial branch blocks, to then, proceed with bilateral cervical medial branch rhizotomies.  Other options for treatment of his chronic occipital headaches would include the possibility of diagnostic and therapeutic bilateral greater occipital nerve blocks by hydrodissection technique under ultrasound and fluoroscopic guidance.  Upon conclusion of our encounter, patient opted to have my nurse call him later to set up his appointment because he wanted to consider interventional pain management options once he gets home.  4. Long-term rehabilitation efforts:  A. The patient does not have a history of falls. I did complete a risk assessment for falls  B.  I have recommended that patient starts a comprehensive physical therapy program for therapeutic exercise, upper body strengthening/posture correction, core strengthening, gait and balance training, neurodynamics, myofascial release, cupping and dry needling  posture/position body mechanics  C. Start an exercise program such as yoga and daily walks for fitness  D. Contrast therapy: Apply ice-packs for 15-20 minutes, followed by heating pads for 15-20 minutes to affected area   E. Patient has been screened for tobacco use: Current tobacco user. Smoking Cessation: I have advised the patient at length regarding the long-term deleterious effects of smoking and have provided the patient lifestyle modification strategies to facilitate smoking cessation.  He tells me that he has down from 2 to 3 packs a day to 1 to 1-1/2 packs/day.  He has declined pharmacological intervention at this time. I spent approximately 4 minutes advising the patient.   5. The patient has been instructed to contact my office with any questions or difficulties. The patient understands the plan and agrees to proceed accordingly.      Patient Care Team:  Elvis Garcia MD as PCP - General (Family Medicine)  Fahad Bruno DO as Referring Physician (Pain Medicine)  Clayton Crawford MD (Orthopedic Surgery)     No orders of the defined types were placed in this encounter.        No future appointments.      Gerry Dickson MD     EMR Dragon/Transcription disclaimer:  Much of this encounter note is an electronic transcription of spoken language to printed text. Electronic transcription of spoken language may permit erroneous, or at times, nonsensical words or phrases to be inadvertently transcribed. Although I have reviewed the note for such errors, some may still exist.

## 2021-10-28 ENCOUNTER — OFFICE VISIT (OUTPATIENT)
Dept: PAIN MEDICINE | Facility: CLINIC | Age: 61
End: 2021-10-28

## 2021-10-28 VITALS
HEART RATE: 71 BPM | WEIGHT: 117.8 LBS | TEMPERATURE: 97.5 F | DIASTOLIC BLOOD PRESSURE: 78 MMHG | BODY MASS INDEX: 18.93 KG/M2 | SYSTOLIC BLOOD PRESSURE: 115 MMHG | OXYGEN SATURATION: 96 % | HEIGHT: 66 IN

## 2021-10-28 DIAGNOSIS — Z71.6 ENCOUNTER FOR SMOKING CESSATION COUNSELING: ICD-10-CM

## 2021-10-28 DIAGNOSIS — F32.A ANXIETY AND DEPRESSION: ICD-10-CM

## 2021-10-28 DIAGNOSIS — F17.200 CURRENT EVERY DAY SMOKER: ICD-10-CM

## 2021-10-28 DIAGNOSIS — F41.9 ANXIETY AND DEPRESSION: ICD-10-CM

## 2021-10-28 DIAGNOSIS — M47.812 CERVICAL SPONDYLOSIS WITHOUT MYELOPATHY: ICD-10-CM

## 2021-10-28 DIAGNOSIS — R26.9 GAIT DISTURBANCE: ICD-10-CM

## 2021-10-28 DIAGNOSIS — M06.9 RHEUMATOID ARTHRITIS INVOLVING MULTIPLE SITES, UNSPECIFIED WHETHER RHEUMATOID FACTOR PRESENT (HCC): ICD-10-CM

## 2021-10-28 DIAGNOSIS — M50.30 DDD (DEGENERATIVE DISC DISEASE), CERVICAL: ICD-10-CM

## 2021-10-28 DIAGNOSIS — M48.02 CERVICAL SPINAL STENOSIS: ICD-10-CM

## 2021-10-28 DIAGNOSIS — R53.81 PHYSICAL DECONDITIONING: ICD-10-CM

## 2021-10-28 DIAGNOSIS — M47.812 CERVICAL SPONDYLOSIS WITHOUT MYELOPATHY: Primary | ICD-10-CM

## 2021-10-28 DIAGNOSIS — M54.81 OCCIPITAL NEURALGIA, UNSPECIFIED LATERALITY: ICD-10-CM

## 2021-10-28 PROCEDURE — 99406 BEHAV CHNG SMOKING 3-10 MIN: CPT | Performed by: ANESTHESIOLOGY

## 2021-10-28 PROCEDURE — 99204 OFFICE O/P NEW MOD 45 MIN: CPT | Performed by: ANESTHESIOLOGY

## 2021-10-28 RX ORDER — ALENDRONATE SODIUM 70 MG/1
TABLET ORAL
COMMUNITY
Start: 2021-10-23 | End: 2022-04-08

## 2021-10-28 RX ORDER — PANTOPRAZOLE SODIUM 40 MG/1
TABLET, DELAYED RELEASE ORAL
COMMUNITY
Start: 2021-10-11

## 2021-10-28 RX ORDER — CHOLECALCIFEROL (VITAMIN D3) 125 MCG
CAPSULE ORAL
COMMUNITY
Start: 2021-10-11

## 2021-10-28 RX ORDER — MOMETASONE FUROATE AND FORMOTEROL FUMARATE DIHYDRATE 200; 5 UG/1; UG/1
AEROSOL RESPIRATORY (INHALATION)
COMMUNITY
Start: 2021-10-27 | End: 2022-02-09

## 2021-10-28 RX ORDER — ASPIRIN 81 MG/1
TABLET, COATED ORAL
COMMUNITY
Start: 2021-10-06 | End: 2022-04-08 | Stop reason: SDUPTHER

## 2021-10-28 RX ORDER — FOLIC ACID 1 MG/1
TABLET ORAL
COMMUNITY
Start: 2021-10-06

## 2021-10-28 RX ORDER — BACLOFEN 20 MG/1
TABLET ORAL
COMMUNITY
Start: 2021-09-27

## 2021-10-29 ENCOUNTER — TELEPHONE (OUTPATIENT)
Dept: PAIN MEDICINE | Facility: CLINIC | Age: 61
End: 2021-10-29

## 2021-10-29 NOTE — TELEPHONE ENCOUNTER
Spoke with Lauren at pts PCP's office and advised sending over last office visit of ours with the pt.

## 2021-10-29 NOTE — TELEPHONE ENCOUNTER
Caller: POOJA     Relationship: PCP OFFICE    Best call back number: [phone]  [phone]    What form or medical record are you requesting: OFFICE NOTES FROM 10/28/21    Who [facility] this [facility]    How would you like to receive the form or medical records (pick-up, mail, fax): FAX  If fax, what is the fax number: [phone]

## 2021-12-07 ENCOUNTER — TELEPHONE (OUTPATIENT)
Dept: NEUROSURGERY | Facility: CLINIC | Age: 61
End: 2021-12-07

## 2021-12-07 DIAGNOSIS — M54.10 RADICULOPATHY, UNSPECIFIED SPINAL REGION: ICD-10-CM

## 2021-12-07 DIAGNOSIS — M50.30 DEGENERATION OF CERVICAL INTERVERTEBRAL DISC: Primary | ICD-10-CM

## 2022-01-12 ENCOUNTER — TELEPHONE (OUTPATIENT)
Dept: NEUROSURGERY | Facility: CLINIC | Age: 62
End: 2022-01-12

## 2022-01-12 NOTE — TELEPHONE ENCOUNTER
Contacted patient and confirmed the patient's appointment date and time with him.   I let patient know if further imaging is needed she will order them during the appointment. He voiced understanding and was thankful for the call.

## 2022-01-12 NOTE — TELEPHONE ENCOUNTER
Per the telephone encounter on 12/7/2021.  Patient is to follow-up with PA.  At that time, we will discuss any further imaging.    According to patient's chart he has scheduled appointment on 1/14/2022 with Apro at 11 AM.

## 2022-01-12 NOTE — TELEPHONE ENCOUNTER
Provider:  Nigel  Caller: patient  Time of call:   10:28  Phone #:  [phone]  Surgery:  Kyphoplasty  Surgery Date:  12-1-2017  Last visit:  8-4-2020   Next visit: 1-    YOSHI:         Reason for call:     Patient called and wanted to verify the time he was supposed to be here on the 14th. He also wanted to know if he needed any new scans because his neck was hurting. I have called the patient to get more information, no answer I left a VM for him to call back. Thank you.

## 2022-01-14 ENCOUNTER — OFFICE VISIT (OUTPATIENT)
Dept: NEUROSURGERY | Facility: CLINIC | Age: 62
End: 2022-01-14

## 2022-01-14 VITALS — BODY MASS INDEX: 19.99 KG/M2 | TEMPERATURE: 97.8 F | HEIGHT: 66 IN | WEIGHT: 124.4 LBS

## 2022-01-14 DIAGNOSIS — Z71.6 ENCOUNTER FOR SMOKING CESSATION COUNSELING: ICD-10-CM

## 2022-01-14 DIAGNOSIS — M50.30 DEGENERATION OF CERVICAL INTERVERTEBRAL DISC: Primary | ICD-10-CM

## 2022-01-14 DIAGNOSIS — M43.10 ACQUIRED SPONDYLOLISTHESIS: ICD-10-CM

## 2022-01-14 DIAGNOSIS — M54.10 RADICULOPATHY, UNSPECIFIED SPINAL REGION: ICD-10-CM

## 2022-01-14 DIAGNOSIS — S14.3XXA INJURY OF BRACHIAL PLEXUS, INITIAL ENCOUNTER: ICD-10-CM

## 2022-01-14 PROCEDURE — 99214 OFFICE O/P EST MOD 30 MIN: CPT | Performed by: PHYSICIAN ASSISTANT

## 2022-01-14 RX ORDER — CALCIUM CARB/VIT D3/MINERALS 600 MG-200
TABLET,CHEWABLE ORAL
COMMUNITY
Start: 2021-12-29 | End: 2022-12-20

## 2022-01-14 RX ORDER — ATORVASTATIN CALCIUM 40 MG/1
TABLET, FILM COATED ORAL
COMMUNITY
Start: 2021-12-17

## 2022-01-14 NOTE — PROGRESS NOTES
Patient: Rodolfo Hill  : 1960  Chart #: [id number]    Date of Service: 2022    CHIEF COMPLAINT: Neck and right arm pain with sensory alteration      History of Present Illness Mr. Hill is seen in follow up. He is a 61-year-old  who has a history of rheumatoid arthritis and osteoporosis.  In  he underwent T9 and T8 kyphoplasty for osteoporotic compression fractures.    When I last saw him, he was complaining of neck and left arm symptoms after he was involved in an accident that involved a horse.  He went to the emergency room and was treated for distal clavicle fracture, some reported rib fractures, and a transverse process fracture at T1 and T2.  He is treated chronically with Neurontin, oxycodone, diazepam, and NSAIDs for chronic pain.    He smokes 1 pack/day of tobacco products.  His main complaint today is increased neck pain that occasionally radiates into the right arm.  He has been experiencing some sensory alteration in the thumb and index finger.  Symptoms are worse with overuse.  Improved with rest.  He is also had some episodes where he blacks out when turning his head too quickly.  This is happened 2 or 3 times over the course of the past 6 months.  He denies focal weakness or lower extremity complaints.  He was scheduled for shoulder replacement surgery on the right but that was put on hold once he started developing more neck difficulties.    Past Medical History:   Diagnosis Date   • Anxiety    • COPD (chronic obstructive pulmonary disease) (HCC)    • Depression    • Hyperlipidemia    • Hypertension    • Myocardial infarct (HCC)    • Osteoporosis    • Rheumatoid arthritis (HCC)    • Sleep apnea with use of continuous positive airway pressure (CPAP)          Current Outpatient Medications:   •  alendronate (FOSAMAX) 70 MG tablet, , Disp: , Rfl:   •  Aspirin Low Dose 81 MG EC tablet, , Disp: , Rfl:   •  atorvastatin (LIPITOR) 40 MG tablet, , Disp: , Rfl:   •   baclofen (LIORESAL) 20 MG tablet, , Disp: , Rfl:   •  budesonide-formoterol (SYMBICORT) 160-4.5 MCG/ACT inhaler, Inhale 2 puffs 2 (Two) Times a Day., Disp: , Rfl:   •  carvedilol (COREG) 3.125 MG tablet, Take 3.125 mg by mouth 2 (Two) Times a Day With Meals., Disp: , Rfl:   •  clotrimazole (LOTRIMIN) 1 % cream, Apply 1 application topically 2 (Two) Times a Day., Disp: , Rfl:   •  CVS B-12 500 MCG tablet, , Disp: , Rfl:   •  CVS D3 125 MCG (5000 UT) capsule capsule, , Disp: , Rfl:   •  diazePAM (VALIUM) 5 MG tablet, Take 5 mg by mouth 2 (Two) Times a Day As Needed for Anxiety., Disp: , Rfl:   •  diclofenac (VOLTAREN) 1 % gel gel, Apply 4 g topically 4 (Four) Times a Day As Needed., Disp: , Rfl:   •  Dulera 200-5 MCG/ACT inhaler, , Disp: , Rfl:   •  folic acid (FOLVITE) 1 MG tablet, , Disp: , Rfl:   •  gabapentin (NEURONTIN) 600 MG tablet, Take 600 mg by mouth 4 (Four) Times a Day., Disp: , Rfl:   •  lisinopril (PRINIVIL,ZESTRIL) 10 MG tablet, Take 10 mg by mouth Daily., Disp: , Rfl:   •  meloxicam (MOBIC) 15 MG tablet, Take 15 mg by mouth Daily., Disp: , Rfl:   •  methotrexate 2.5 MG tablet, , Disp: , Rfl:   •  nicotine polacrilex (NICORETTE) 4 MG gum, , Disp: , Rfl:   •  nortriptyline (PAMELOR) 25 MG capsule, Take 25 mg by mouth At Night As Needed., Disp: , Rfl:   •  oxyCODONE (ROXICODONE) 10 MG tablet, , Disp: , Rfl:   •  pantoprazole (PROTONIX) 40 MG EC tablet, , Disp: , Rfl:   •  Tiotropium Bromide Monohydrate (SPIRIVA HANDIHALER IN), Inhale 2 puffs Daily., Disp: , Rfl:     Past Surgical History:   Procedure Laterality Date   • CARPAL TUNNEL RELEASE     • KYPHOPLASTY N/A 12/1/2017    Procedure: KYPHOPLASTY T8-9;  Surgeon: Fahad Santana MD;  [facility]  Service:    • SHOULDER SURGERY Left 2019       Social History     Socioeconomic History   • Marital status: Legally    Tobacco Use   • Smoking status: Current Every Day Smoker     Packs/day: 1.00     Years: 40.00     Pack years: 40.00      "Types: Cigarettes   • Smokeless tobacco: Never Used   • Tobacco comment: Patient is trying to quit-he uses nicotine chewing gum 01/14/2022   Vaping Use   • Vaping Use: Never used   Substance and Sexual Activity   • Alcohol use: Yes     Comment: rarely   • Drug use: No   • Sexual activity: Defer         Review of Systems   Musculoskeletal: Positive for myalgias.   Neurological: Positive for weakness.   All other systems reviewed and are negative.      Objective   Vital Signs: Temperature 97.8 °F (36.6 °C), height 167.6 cm (65.98\"), weight 56.4 kg (124 lb 6.4 oz).  Physical Exam  Constitutional: He appears well-developed and well-nourished. No distress.   HENT:   Head: Normocephalic and atraumatic.   Eyes: EOM are normal.   Cardiovascular: Normal rate and regular rhythm.   Pulmonary/Chest: Effort normal and breath sounds normal.   Psychiatric: He has a normal mood and affect. His behavior is normal. Thought content normal.   Nursing note and vitals reviewed.  Musculoskeletal:  Station and gait are normal.  Neurologic:  Muscle tone is normal throughout.  Coordination is intact.  Deep tendon reflexes: absent in the lower extremities.   Sensation is intact to light touch throughout.  Patient is oriented to person, place, and time.  Julian sign negative.      Independent review of radiographic imaging: Old CT of the cervical spine demonstrates grade 1 or nearly grade 2 anterior subluxation of C6 on C7 felt to be chronic and associated with advanced degenerative disc disease.  No evidence to suggest acute cervical spine trauma.    EMG/NCV studies demonstrate injury to the lower trunk of the left brachial plexus and mild median neuropathy at the wrist.       Assessment/Plan   Diagnosis:   1.  Cervical degenerative disc and joint disease with possible radiculopathy  2.  Left brachial plexus injury    3.  Spondylolisthesis C6-7    Medical Decision Making: I have referred patient for an MRI of the cervical spine and " electrodiagnostic studies of the right upper extremity.  He was encouraged to work on smoking cessation.  He will follow-up in our office and further recommendations will be made at that time.          Diagnoses and all orders for this visit:    1. Degeneration of cervical intervertebral disc (Primary)  -     EMG & Nerve Conduction Test; Future  -     MRI Cervical Spine Without Contrast; Future    2. Radiculopathy, unspecified spinal region  -     EMG & Nerve Conduction Test; Future  -     MRI Cervical Spine Without Contrast; Future    3. Acquired spondylolisthesis  -     EMG & Nerve Conduction Test; Future  -     MRI Cervical Spine Without Contrast; Future    4. Encounter for smoking cessation counseling  -     EMG & Nerve Conduction Test; Future  -     MRI Cervical Spine Without Contrast; Future    5. Injury of brachial plexus, initial encounter  -     EMG & Nerve Conduction Test; Future  -     MRI Cervical Spine Without Contrast; Future                   I discussed >3m the need to STOP smoking, there is a direct correlation between smoking and wound healing and degenerative disc disease. Patient will take this under advisement and discuss with their primary provider.        Kate Manning PA-C  Patient Care Team:  Elvis Garcia MD as PCP - General (Family Medicine)  Fahad Bruno DO as Referring Physician (Pain Medicine)  Clayton Crawford MD (Orthopedic Surgery)               Yes

## 2022-01-19 ENCOUNTER — APPOINTMENT (OUTPATIENT)
Dept: MRI IMAGING | Facility: HOSPITAL | Age: 62
End: 2022-01-19

## 2022-01-20 ENCOUNTER — HOSPITAL ENCOUNTER (OUTPATIENT)
Dept: MRI IMAGING | Facility: HOSPITAL | Age: 62
End: 2022-01-20

## 2022-01-20 ENCOUNTER — HOSPITAL ENCOUNTER (OUTPATIENT)
Dept: MRI IMAGING | Facility: HOSPITAL | Age: 62
Discharge: HOME | End: 2022-01-20
Admitting: PHYSICIAN ASSISTANT

## 2022-01-20 DIAGNOSIS — M43.10 ACQUIRED SPONDYLOLISTHESIS: ICD-10-CM

## 2022-01-20 DIAGNOSIS — M54.10 RADICULOPATHY, UNSPECIFIED SPINAL REGION: ICD-10-CM

## 2022-01-20 DIAGNOSIS — Z71.6 ENCOUNTER FOR SMOKING CESSATION COUNSELING: ICD-10-CM

## 2022-01-20 DIAGNOSIS — S14.3XXA INJURY OF BRACHIAL PLEXUS, INITIAL ENCOUNTER: ICD-10-CM

## 2022-01-20 DIAGNOSIS — M50.30 DEGENERATION OF CERVICAL INTERVERTEBRAL DISC: ICD-10-CM

## 2022-01-20 PROCEDURE — 72141 MRI NECK SPINE W/O DYE: CPT

## 2022-02-09 ENCOUNTER — OFFICE VISIT (OUTPATIENT)
Dept: NEUROSURGERY | Facility: CLINIC | Age: 62
End: 2022-02-09

## 2022-02-09 VITALS
TEMPERATURE: 97.3 F | BODY MASS INDEX: 19.19 KG/M2 | HEIGHT: 66 IN | SYSTOLIC BLOOD PRESSURE: 118 MMHG | DIASTOLIC BLOOD PRESSURE: 68 MMHG | WEIGHT: 119.4 LBS

## 2022-02-09 DIAGNOSIS — M50.30 DEGENERATION OF CERVICAL INTERVERTEBRAL DISC: Primary | ICD-10-CM

## 2022-02-09 DIAGNOSIS — M54.10 RADICULOPATHY, UNSPECIFIED SPINAL REGION: ICD-10-CM

## 2022-02-09 DIAGNOSIS — M43.10 ACQUIRED SPONDYLOLISTHESIS: ICD-10-CM

## 2022-02-09 DIAGNOSIS — Z71.6 ENCOUNTER FOR SMOKING CESSATION COUNSELING: ICD-10-CM

## 2022-02-09 PROCEDURE — 99214 OFFICE O/P EST MOD 30 MIN: CPT | Performed by: PHYSICIAN ASSISTANT

## 2022-02-09 RX ORDER — ASPIRIN 81 MG/1
TABLET ORAL
COMMUNITY
Start: 2022-01-27

## 2022-02-09 RX ORDER — CARVEDILOL 12.5 MG/1
12.5 TABLET ORAL 2 TIMES DAILY
COMMUNITY
Start: 2022-01-17

## 2022-02-09 NOTE — PROGRESS NOTES
"Patient: Rodolfo Hill  : 1960  Chart #: [id number]    Date of Service: 22    CHIEF COMPLAINT: Neck and right arm pain with sensory alteration      History of Present Illness Mr. Hill is seen in follow up. He is a 61-year-old  who has a history of rheumatoid arthritis and osteoporosis.  In  he underwent T9 and T8 kyphoplasty for osteoporotic compression fractures.  He has chronic neck pain for which he is treated with Neurontin, oxycodone, diazepam, and NSAIDs.    He smokes 1 pack/day of tobacco products.  He is here today to get surgical clearance for right shoulder replacement surgery.  He reports having 3 episodes over the past 6 months or so where turning his head to the right causes him to \"blackout\".  He also complains of ongoing neck pain with radiation down the right arm and sensory alteration in the thumb and index finger.  Most of his pain is localized in the right shoulder.  He has limited range of motion.      Past Medical History:   Diagnosis Date   • Anxiety    • COPD (chronic obstructive pulmonary disease) ([facility] [place])    • Depression    • Hyperlipidemia    • Hypertension    • Myocardial infarct ([facility] [place])    • Osteoporosis    • Rheumatoid arthritis (HCC)    • Sleep apnea with use of continuous positive airway pressure (CPAP)          Current Outpatient Medications:   •  alendronate (FOSAMAX) 70 MG tablet, , Disp: , Rfl:   •  Anoro Ellipta 62.5-25 MCG/INH aerosol powder  inhaler, , Disp: , Rfl:   •  ASPIR 81 MG EC tablet, TAKE 1 TABLET BY MOUTH EVERY DAY, Disp: , Rfl:   •  Aspirin Low Dose 81 MG EC tablet, , Disp: , Rfl:   •  atorvastatin (LIPITOR) 40 MG tablet, , Disp: , Rfl:   •  baclofen (LIORESAL) 20 MG tablet, , Disp: , Rfl:   •  budesonide-formoterol (SYMBICORT) 160-4.5 MCG/ACT inhaler, Inhale 2 puffs 2 (Two) Times a Day., Disp: , Rfl:   •  carvedilol (COREG) 12.5 MG tablet, Take 12.5 mg by mouth 2 (Two) Times a Day., Disp: , Rfl:   •  clotrimazole (LOTRIMIN) 1 % " cream, Apply 1 application topically 2 (Two) Times a Day., Disp: , Rfl:   •  CVS B-12 500 MCG tablet, , Disp: , Rfl:   •  CVS D3 125 MCG (5000 UT) capsule capsule, , Disp: , Rfl:   •  diazePAM (VALIUM) 5 MG tablet, Take 5 mg by mouth 2 (Two) Times a Day As Needed for Anxiety., Disp: , Rfl:   •  diclofenac (VOLTAREN) 1 % gel gel, Apply 4 g topically 4 (Four) Times a Day As Needed., Disp: , Rfl:   •  folic acid (FOLVITE) 1 MG tablet, , Disp: , Rfl:   •  gabapentin (NEURONTIN) 600 MG tablet, Take 600 mg by mouth 4 (Four) Times a Day., Disp: , Rfl:   •  lisinopril (PRINIVIL,ZESTRIL) 10 MG tablet, Take 10 mg by mouth Daily., Disp: , Rfl:   •  meloxicam (MOBIC) 15 MG tablet, Take 15 mg by mouth Daily., Disp: , Rfl:   •  methotrexate 2.5 MG tablet, , Disp: , Rfl:   •  nicotine polacrilex (NICORETTE) 4 MG gum, , Disp: , Rfl:   •  nortriptyline (PAMELOR) 25 MG capsule, Take 25 mg by mouth At Night As Needed., Disp: , Rfl:   •  oxyCODONE (ROXICODONE) 10 MG tablet, , Disp: , Rfl:   •  pantoprazole (PROTONIX) 40 MG EC tablet, , Disp: , Rfl:   •  Tiotropium Bromide Monohydrate (SPIRIVA HANDIHALER IN), Inhale 2 puffs Daily., Disp: , Rfl:     Past Surgical History:   Procedure Laterality Date   • CARPAL TUNNEL RELEASE     • KYPHOPLASTY N/A 12/1/2017    Procedure: KYPHOPLASTY T8-9;  Surgeon: Fahad Santana MD;  Location: [facility];  Service:    • SHOULDER SURGERY Left 2019       Social History     Socioeconomic History   • Marital status:    Tobacco Use   • Smoking status: Current Every Day Smoker     Packs/day: 1.00     Years: 40.00     Pack years: 40.00     Types: Cigarettes   • Smokeless tobacco: Never Used   • Tobacco comment: Patient is trying to quit-he uses nicotine chewing gum 01/14/2022   Vaping Use   • Vaping Use: Never used   Substance and Sexual Activity   • Alcohol use: Yes     Comment: rarely   • Drug use: No   • Sexual activity: Defer         Review of Systems   Musculoskeletal: Positive for myalgias.  "  Neurological: Positive for weakness.   All other systems reviewed and are negative.      Objective   Vital Signs: Blood pressure 118/68, temperature 97.3 °F (36.3 °C), temperature source Infrared, height 167.6 cm (65.98\"), weight 54.2 kg (119 lb 6.4 oz).  Physical Exam  Constitutional: He appears well-developed and well-nourished. No distress.   HENT:   Head: Normocephalic and atraumatic.   Eyes: EOM are normal.   Cardiovascular: Normal rate and regular rhythm.   Pulmonary/Chest: Effort normal and breath sounds normal.   Psychiatric: He has a normal mood and affect. His behavior is normal. Thought content normal.   Nursing note and vitals reviewed.  Musculoskeletal:  Station and gait are normal.  Experiences pain in the neck especially with rotation to the right.  Limited range of motion in the right shoulder.  Neurologic:  Muscle tone is normal throughout.  Coordination is intact.  Deep tendon reflexes: absent in the lower extremities.   Sensation is intact to light touch throughout.  Patient is oriented to person, place, and time.  Julian sign negative.      Independent review of radiographic imaging: MRI of the cervical spine dated 1/23/2022 was reviewed.  This demonstrates multilevel degenerative disc and joint disease.  At C6-7 there is an anterior listhesis with right greater than left neuroforaminal narrowing.  No evidence of cord compromise.  Imaging was also reviewed by Dr. Santana    EMG/NCV studies demonstrate bilateral C8-T1 radiculopathies.  Due to EMG changes in the right deltoid, cannot rule out chronic right C5-6 radiculopathy versus chronic changes from right shoulder issues.  No electrophysical evidence for carpal tunnel syndrome, ulnar neuropathy, or myopathy       Assessment/Plan   Diagnosis:   1.  Cervical degenerative disc and joint disease with radiculopathy  2.  Left brachial plexus injury    3.  Spondylolisthesis C6-7    Medical Decision Making: For completeness I am going to check an MRI " of the neck with views of patient's head rotated right and center checking for vertebral artery occlusion.  I have also ordered flexion-extension plain films of the cervical spine.  I am happy to review these images and follow-up with patient via telephone encounter in order to clear him for his shoulder surgery.  He may harbor some radicular symptoms coming from his neck however his shoulder seems to be the main issue at this time.  I would be happy to see him back after his surgery if he continues with bothersome arm pain.        Diagnoses and all orders for this visit:    1. Degeneration of cervical intervertebral disc (Primary)  -     MRI angiogram neck w contrast; Future  -     XR spine cervical flex and ext only; Future    2. Radiculopathy, unspecified spinal region  -     MRI angiogram neck w contrast; Future  -     XR spine cervical flex and ext only; Future    3. Acquired spondylolisthesis  -     MRI angiogram neck w contrast; Future  -     XR spine cervical flex and ext only; Future    4. Encounter for smoking cessation counseling  -     MRI angiogram neck w contrast; Future  -     XR spine cervical flex and ext only; Future                   I discussed >3m the need to STOP smoking, there is a direct correlation between smoking and wound healing and degenerative disc disease. Patient will take this under advisement and discuss with their primary provider.        Kate Manning PA-C  Patient Care Team:  Elvis Garcia MD as PCP - General (Family Medicine)  Fahad Bruno DO as Referring Physician (Pain Medicine)  Clayton Crawford MD (Orthopedic Surgery)

## 2022-03-02 ENCOUNTER — HOSPITAL ENCOUNTER (OUTPATIENT)
Dept: MRI IMAGING | Facility: HOSPITAL | Age: 62
Discharge: HOME | End: 2022-03-02

## 2022-03-02 ENCOUNTER — HOSPITAL ENCOUNTER (OUTPATIENT)
Dept: GENERAL RADIOLOGY | Facility: HOSPITAL | Age: 62
Discharge: HOME | End: 2022-03-02

## 2022-03-02 DIAGNOSIS — M50.30 DEGENERATION OF CERVICAL INTERVERTEBRAL DISC: ICD-10-CM

## 2022-03-02 DIAGNOSIS — M43.10 ACQUIRED SPONDYLOLISTHESIS: ICD-10-CM

## 2022-03-02 DIAGNOSIS — Z71.6 ENCOUNTER FOR SMOKING CESSATION COUNSELING: ICD-10-CM

## 2022-03-02 DIAGNOSIS — M54.10 RADICULOPATHY, UNSPECIFIED SPINAL REGION: ICD-10-CM

## 2022-03-02 LAB — CREAT BLDA-MCNC: 0.8 MG/DL (ref 0.6–1.3)

## 2022-03-02 PROCEDURE — 70548 MR ANGIOGRAPHY NECK W/DYE: CPT

## 2022-03-02 PROCEDURE — A9577 INJ MULTIHANCE: HCPCS | Performed by: PHYSICIAN ASSISTANT

## 2022-03-02 PROCEDURE — 0 GADOBENATE DIMEGLUMINE 529 MG/ML SOLUTION: Performed by: PHYSICIAN ASSISTANT

## 2022-03-02 PROCEDURE — 72040 X-RAY EXAM NECK SPINE 2-3 VW: CPT

## 2022-03-02 PROCEDURE — 82565 ASSAY OF CREATININE: CPT

## 2022-03-02 RX ADMIN — GADOBENATE DIMEGLUMINE 9 ML: 529 INJECTION, SOLUTION INTRAVENOUS at 14:42

## 2022-03-25 ENCOUNTER — TELEPHONE (OUTPATIENT)
Dept: NEUROSURGERY | Facility: CLINIC | Age: 62
End: 2022-03-25

## 2022-03-25 NOTE — TELEPHONE ENCOUNTER
PATIENT WENT TO HIS PCP AND WAS ADVISE THAT THE SHOULDER ISSUE WAS NOT THE MAIN CONCERN AND HE  NEEDED TO FOCUS ON THE NECK.  HE WOULD LIKE CLINICAL TO CALL HIM BACK TO DISCUSS WHAT HE SHOULD DO.  THE LAST OFFICE NOTE FROM KENTON AGUILAR STATES THE FOCUS IS ON HIS SHOULDER.  PLEASE ADVISE PATIENT.    [phone]

## 2022-03-25 NOTE — TELEPHONE ENCOUNTER
"Provider:  Nigel  Caller: Patient  Time of call:  3:53   Phone #:  [phone]  Surgery:  T8-9 Kypho  Surgery Date: 12/01/2017   Last visit:  Office Visit with Nigel, Kate Boyd PA-C (02/09/2022)   Next visit: NA    Reason for call:         Please see encounter from Alicia.     Per last OV with Apro:    \"Medical Decision Making: For completeness I am going to check an MRI of the neck with views of patient's head rotated right and center checking for vertebral artery occlusion.  I have also ordered flexion-extension plain films of the cervical spine.  I am happy to review these images and follow-up with patient via telephone encounter in order to clear him for his shoulder surgery.  He may harbor some radicular symptoms coming from his neck however his shoulder seems to be the main issue at this time.  I would be happy to see him back after his surgery if he continues with bothersome arm pain.\"       Okay to have patient come with the MRI and XR?   Who should patient see?   "

## 2022-03-29 NOTE — TELEPHONE ENCOUNTER
"I called patient to schedule a televisit. Appointment was not scheduled.    He stated that his shoulder surgeon postponed his surgery (was originally scheduled for 12/1/21) due to his recent weight loss. He has had blood in his stool and has undergone labs, colonoscopy, and an upper GI is scheduled for tomorrow to find the source for this issue. His PCP has to medically release him to get his shoulder surgery done.    He would like to talk to someone clinical (asked to speak with Dr. Santana) to discuss cervical spine surgery. He would like to know if we recommend that he get his shoulder surgery done first or should he undergo neck surgery first? He said when he originally saw Dr. Santana that he was told that his neck issue was \"inoperable\", but Nigel TOLEDO told him that he needed neck surgery. He would like clarification.  "

## 2022-04-08 ENCOUNTER — OFFICE VISIT (OUTPATIENT)
Dept: NEUROSURGERY | Facility: CLINIC | Age: 62
End: 2022-04-08

## 2022-04-08 VITALS — WEIGHT: 122 LBS | BODY MASS INDEX: 19.61 KG/M2 | HEIGHT: 66 IN

## 2022-04-08 DIAGNOSIS — M47.812 CERVICAL SPONDYLOSIS WITHOUT MYELOPATHY: Primary | ICD-10-CM

## 2022-04-08 DIAGNOSIS — M50.30 DDD (DEGENERATIVE DISC DISEASE), CERVICAL: ICD-10-CM

## 2022-04-08 PROCEDURE — 99443 PR PHYS/QHP TELEPHONE EVALUATION 21-30 MIN: CPT | Performed by: PHYSICIAN ASSISTANT

## 2022-04-08 NOTE — PROGRESS NOTES
Rodolfo Hill   1960   [id number]     04/08/2022     Telemedicine: HORACIO Ledesma  Location of Provider: Office  Type of Service: consult via telemedicine  Any physical exam was assisted by the patient.  All communications with the patient (verbal, audiovisual and written) were documented in the patient's medical record per documentation standards.  Mode of transmission: Telemedicine via 2-way interactive A/V telecommunication.  Basis for telemedicine: COVID-19    HPI:    You have chosen to receive care through a telephone visit. Do you consent to use a telephone visit for your medical care today? yes    CC:osteoporosis    HPI:  63 yo with RA and OA. He has severe cervical spondylosis at C6-7. He is seen by Rheumatology and GI specialists at .  Patient was scheduled for shoulder surgery but cancelled to weight loss. He is being worked for cancer, coloscopy was ok. Upper GI is to have a 3 month f/u for upper GI.  He is rescheduled for his shoulder surgery later this month, his PCP wanted him to talk with us regarding his cervical spondylosis.  His symptoms continue with severe right shoulder pain with numbness into the thumb and index finger.    Past medical history, allergies, medications, surgical history, social history, family history reviewed and updated.    Social History     Tobacco Use   Smoking Status Current Every Day Smoker   • Packs/day: 1.00   • Years: 40.00   • Pack years: 40.00   • Types: Cigarettes   Smokeless Tobacco Never Used   Tobacco Comment    Patient is trying to quit-he uses nicotine chewing gum 01/14/2022        Body mass index is 19.7 kg/m².       Physical examination:  The patient sounds well on the phone, no cough, SOB or wheezing is noted.    Review of new studies:  I have reviewed the cervical MRI, flexion-extension x-rays of the cervical spine as well as EMG and nerve conduction study that was completed.  Cervical spondylosis is noted at C5-6 and C6-7,  flexion-extension x-rays reveal a mild change at C5-6 and a fixed change at C6-7.  EMG and nerve conduction study shows a C8-T1 radiculopathy and a chronic right C5-6 radiculopathy versus changes from his right shoulder pathology.  I have reviewed all of the studies with Dr. Santana.    Assessment/Plan:  1. Cervical spondylosis without myelopathy    2. DDD (degenerative disc disease), cervical      I recommendation at this time is to continue with plans for his right shoulder surgery.  I have asked him to give us a call in the office after his shoulder surgery with an update.    This was an audio and video enabled telemedicine encounter. This visit has been rescheduled as a phone visit to comply with patient safety concerns in accordance with CDC recommendations.     Total time of discussion was 30 minutes.       HORACIO James Brian A, MD

## 2022-08-26 ENCOUNTER — TELEPHONE (OUTPATIENT)
Dept: NEUROSURGERY | Facility: CLINIC | Age: 62
End: 2022-08-26

## 2022-08-26 NOTE — TELEPHONE ENCOUNTER
Caller: Rodolfo Hill    Relationship: Self    Best call back number:[phone]    What is the best time to reach you:ANYTIME    Who are you requesting to speak with (clinical staff, provider,  specific staff member):CLINICAL STAFF    Do you know the name of the person who called:NA    What was the call regarding:PT CALLED AND STATES THAT HE HAS COMPLETED HIS SHOULDER SURGERY-PT WAS TO CALL BACK TO UPDATE OUR OFFICE-PT STATES THAT HE IS STILL HAVING ISSUES AND WANTS TO COME BACK IN TO DISCUSS HIS OPTIONS ABOUT SURGERY-SENDING TO OFFICE FOR SCHEDULING AS PT WOULD LIKE TO DISCUSS. NOT CLEAR FOR FOLLOW UP WITH PAC OR MD-PLEASE CONTACT PT BACK FOR SCHEDULING-THANK YOU!    Do you require a callback:YES

## 2022-08-26 NOTE — TELEPHONE ENCOUNTER
He may be scheduled for a follow up. This can be either with Dr. Santana or with a PA on a day that Dr. Santana is definitely in the office in case surgery needs to be discussed.

## 2022-09-20 ENCOUNTER — OFFICE VISIT (OUTPATIENT)
Dept: NEUROSURGERY | Facility: CLINIC | Age: 62
End: 2022-09-20

## 2022-09-20 VITALS — HEIGHT: 66 IN | BODY MASS INDEX: 19.7 KG/M2 | RESPIRATION RATE: 16 BRPM

## 2022-09-20 DIAGNOSIS — M50.30 DDD (DEGENERATIVE DISC DISEASE), CERVICAL: ICD-10-CM

## 2022-09-20 DIAGNOSIS — Z71.6 ENCOUNTER FOR SMOKING CESSATION COUNSELING: ICD-10-CM

## 2022-09-20 DIAGNOSIS — M47.812 CERVICAL SPONDYLOSIS WITHOUT MYELOPATHY: Primary | ICD-10-CM

## 2022-09-20 DIAGNOSIS — M54.10 RADICULOPATHY, UNSPECIFIED SPINAL REGION: ICD-10-CM

## 2022-09-20 DIAGNOSIS — M06.9 RHEUMATOID ARTHRITIS INVOLVING MULTIPLE SITES, UNSPECIFIED WHETHER RHEUMATOID FACTOR PRESENT: ICD-10-CM

## 2022-09-20 PROCEDURE — 99214 OFFICE O/P EST MOD 30 MIN: CPT | Performed by: PHYSICIAN ASSISTANT

## 2022-09-20 NOTE — PROGRESS NOTES
Patient: Rodolfo Hill  : 1960  Chart #: [id number]    Date of Service: 22    CHIEF COMPLAINT: Neck and right arm pain with sensory alteration      History of Present Illness Mr. Hill is seen in follow up. He is a 62-year-old  who has a history of rheumatoid arthritis and osteoporosis.  In 2017 he underwent T9 and T8 kyphoplasty for osteoporotic compression fractures.  He has chronic neck pain for which he is treated with Neurontin, oxycodone, diazepam, and NSAIDs.    He smokes 1 pack/day of tobacco products.  He complains of neck pain that radiates into the right arm with sensory alteration in the thumb and index finger.  When I last saw him he was mostly complaining of right shoulder pain and limited range of motion.  He underwent shoulder surgery a couple months ago and has recovered well from that. He continues to complain of pain down the right arm, especially when turning his head to the right. He has noticed some  weakness lately on the left and right.     Prior to patient's recent shoulder surgery he underwent extensive work-up for recent unexplained weight loss.  Per patient, work-up was unrevealing.  He has been trying to focus on better nutrition      Past Medical History:   Diagnosis Date   • Anxiety    • COPD (chronic obstructive pulmonary disease) (HCC)    • Depression    • Hyperlipidemia    • Hypertension    • Myocardial infarct (HCC)    • Osteoporosis    • Rheumatoid arthritis (HCC)    • Sleep apnea with use of continuous positive airway pressure (CPAP)          Current Outpatient Medications:   •  Anoro Ellipta 62.5-25 MCG/INH aerosol powder  inhaler, , Disp: , Rfl:   •  ASPIR 81 MG EC tablet, TAKE 1 TABLET BY MOUTH EVERY DAY, Disp: , Rfl:   •  atorvastatin (LIPITOR) 40 MG tablet, , Disp: , Rfl:   •  baclofen (LIORESAL) 20 MG tablet, , Disp: , Rfl:   •  budesonide-formoterol (SYMBICORT) 160-4.5 MCG/ACT inhaler, Inhale 2 puffs 2 (Two) Times a Day., Disp: ,  Rfl:   •  carvedilol (COREG) 12.5 MG tablet, Take 12.5 mg by mouth 2 (Two) Times a Day., Disp: , Rfl:   •  clotrimazole (LOTRIMIN) 1 % cream, Apply 1 application topically 2 (Two) Times a Day., Disp: , Rfl:   •  CVS B-12 500 MCG tablet, , Disp: , Rfl:   •  CVS D3 125 MCG (5000 UT) capsule capsule, , Disp: , Rfl:   •  diazePAM (VALIUM) 5 MG tablet, Take 5 mg by mouth 2 (Two) Times a Day As Needed for Anxiety., Disp: , Rfl:   •  diclofenac (VOLTAREN) 1 % gel gel, Apply 4 g topically 4 (Four) Times a Day As Needed., Disp: , Rfl:   •  folic acid (FOLVITE) 1 MG tablet, , Disp: , Rfl:   •  gabapentin (NEURONTIN) 600 MG tablet, Take 600 mg by mouth 4 (Four) Times a Day., Disp: , Rfl:   •  lisinopril (PRINIVIL,ZESTRIL) 10 MG tablet, Take 10 mg by mouth Daily., Disp: , Rfl:   •  meloxicam (MOBIC) 15 MG tablet, Take 15 mg by mouth Daily., Disp: , Rfl:   •  nicotine polacrilex (NICORETTE) 4 MG gum, , Disp: , Rfl:   •  nortriptyline (PAMELOR) 25 MG capsule, Take 25 mg by mouth At Night As Needed., Disp: , Rfl:   •  oxyCODONE (ROXICODONE) 10 MG tablet, , Disp: , Rfl:   •  pantoprazole (PROTONIX) 40 MG EC tablet, , Disp: , Rfl:   •  Tiotropium Bromide Monohydrate (SPIRIVA HANDIHALER IN), Inhale 2 puffs Daily., Disp: , Rfl:     Past Surgical History:   Procedure Laterality Date   • CARPAL TUNNEL RELEASE     • KYPHOPLASTY N/A 12/01/2017    Procedure: KYPHOPLASTY T8-9;  Surgeon: Fahad Santana MD;  Location: [facility];  Service:    • SHOULDER SURGERY Left 2019    [facility]   • SHOULDER SURGERY Right 05/2022    [facility]       Social History     Socioeconomic History   • Marital status:    Tobacco Use   • Smoking status: Current Every Day Smoker     Packs/day: 1.00     Years: 40.00     Pack years: 40.00     Types: Cigarettes   • Smokeless tobacco: Never Used   • Tobacco comment: Patient is trying to quit-he uses nicotine chewing gum 01/14/2022   Vaping Use   • Vaping Use: Never used  "  Substance and Sexual Activity   • Alcohol use: Not Currently     Comment: rarely   • Drug use: No   • Sexual activity: Defer         Review of Systems   Musculoskeletal: Positive for myalgias.   Neurological: Positive for weakness.   All other systems reviewed and are negative.      Objective   Vital Signs: Resp. rate 16, height 167.6 cm (65.98\").  Physical Exam  Constitutional: He appears well-developed and well-nourished. No distress.   HENT:   Head: Normocephalic and atraumatic.   Eyes: EOM are normal.   Cardiovascular: Normal rate and regular rhythm.   Pulmonary/Chest: Effort normal and breath sounds normal.   Psychiatric: He has a normal mood and affect. His behavior is normal. Thought content normal.   Nursing note and vitals reviewed.  Musculoskeletal:  Station and gait are normal.  Experiences pain in the neck especially with rotation to the right.  Limited range of motion in the right shoulder.  Neurologic:  Muscle tone is normal throughout.  Coordination is intact.  Deep tendon reflexes: absent in the lower extremities.   Sensation is intact to light touch throughout.  Patient is oriented to person, place, and time.  Julian sign negative.      Independent review of radiographic imaging: MRI of the cervical spine dated 1/23/2022 was reviewed.  This demonstrates multilevel degenerative disc and joint disease.  At C6-7 there is an anterior listhesis with right greater than left neuroforaminal narrowing.  No evidence of cord compromise.  Imaging was also reviewed by Dr. Santana    EMG/NCV studies demonstrate bilateral C8-T1 radiculopathies.  Due to EMG changes in the right deltoid, cannot rule out chronic right C5-6 radiculopathy versus chronic changes from right shoulder issues.  No electrophysical evidence for carpal tunnel syndrome, ulnar neuropathy, or myopathy       Assessment & Plan   Diagnosis:   1.  Cervical spondylosis with radiculopathy involving a couple dermatomes  2.  Chronic neck pain    3.  " Right shoulder pain status post surgery, May 2022    Medical Decision Making: Patient's arm symptoms are increasingly becoming more bothersome and concerning to him as he is noticing some functional issues lately.  I have discussed with Dr. Santana and he has recommended CT cervical myelogram.  Patient has a longstanding history of neck pain for which surgery is not likely to address. I once again counseled him on the importance of smoking cessation.  He will follow-up with Dr. Santana after the myelogram.        Diagnoses and all orders for this visit:    1. Cervical spondylosis without myelopathy (Primary)  -     IR Myelogram Cervical Spine; Future  -     CT Cervical Spine With Intrathecal Contrast; Future    2. DDD (degenerative disc disease), cervical  -     IR Myelogram Cervical Spine; Future  -     CT Cervical Spine With Intrathecal Contrast; Future    3. Radiculopathy, unspecified spinal region                   I discussed >3m the need to STOP smoking, there is a direct correlation between smoking and wound healing and degenerative disc disease. Patient will take this under advisement and discuss with their primary provider.        Kate Manning PA-C  Patient Care Team:  Elvis Garcia MD as PCP - General (Family Medicine)  Fahad Bruno DO as Referring Physician (Pain Medicine)  Clayton Crawford MD (Orthopedic Surgery)

## 2022-10-10 ENCOUNTER — TELEPHONE (OUTPATIENT)
Dept: INFUSION THERAPY | Facility: HOSPITAL | Age: 62
End: 2022-10-10

## 2022-10-10 NOTE — TELEPHONE ENCOUNTER
Pre procedure phone call. Instructions, medications, history and procedure reviewed. All questions addressed

## 2022-10-11 ENCOUNTER — HOSPITAL ENCOUNTER (OUTPATIENT)
Dept: GENERAL RADIOLOGY | Facility: HOSPITAL | Age: 62
End: 2022-10-11

## 2022-10-11 ENCOUNTER — APPOINTMENT (OUTPATIENT)
Dept: CT IMAGING | Facility: HOSPITAL | Age: 62
End: 2022-10-11

## 2022-10-18 ENCOUNTER — HOSPITAL ENCOUNTER (OUTPATIENT)
Dept: CT IMAGING | Facility: HOSPITAL | Age: 62
Discharge: HOME | End: 2022-10-18

## 2022-10-18 ENCOUNTER — HOSPITAL ENCOUNTER (OUTPATIENT)
Dept: GENERAL RADIOLOGY | Facility: HOSPITAL | Age: 62
Discharge: HOME | End: 2022-10-18

## 2022-10-18 VITALS
TEMPERATURE: 98 F | OXYGEN SATURATION: 96 % | HEART RATE: 65 BPM | HEIGHT: 66 IN | BODY MASS INDEX: 19.67 KG/M2 | DIASTOLIC BLOOD PRESSURE: 80 MMHG | SYSTOLIC BLOOD PRESSURE: 169 MMHG | RESPIRATION RATE: 18 BRPM | WEIGHT: 122.4 LBS

## 2022-10-18 DIAGNOSIS — M47.812 CERVICAL SPONDYLOSIS WITHOUT MYELOPATHY: ICD-10-CM

## 2022-10-18 DIAGNOSIS — M50.30 DDD (DEGENERATIVE DISC DISEASE), CERVICAL: ICD-10-CM

## 2022-10-18 PROCEDURE — 25010000002 IOPAMIDOL 61 % SOLUTION: Performed by: NEUROLOGICAL SURGERY

## 2022-10-18 PROCEDURE — 62302 MYELOGRAPHY LUMBAR INJECTION: CPT

## 2022-10-18 PROCEDURE — 72240 MYELOGRAPHY NECK SPINE: CPT

## 2022-10-18 PROCEDURE — 72126 CT NECK SPINE W/DYE: CPT

## 2022-10-18 PROCEDURE — 62284 INJECTION FOR MYELOGRAM: CPT | Performed by: NEUROLOGICAL SURGERY

## 2022-10-18 RX ORDER — PROMETHAZINE HYDROCHLORIDE 25 MG/1
25 TABLET ORAL ONCE AS NEEDED
Status: DISCONTINUED | OUTPATIENT
Start: 2022-10-18 | End: 2022-10-19 | Stop reason: HOSPADM

## 2022-10-18 RX ORDER — ONDANSETRON 4 MG/1
4 TABLET, FILM COATED ORAL ONCE AS NEEDED
Status: DISCONTINUED | OUTPATIENT
Start: 2022-10-18 | End: 2022-10-19 | Stop reason: HOSPADM

## 2022-10-18 RX ORDER — LIDOCAINE HYDROCHLORIDE 10 MG/ML
5 INJECTION, SOLUTION EPIDURAL; INFILTRATION; INTRACAUDAL; PERINEURAL ONCE
Status: COMPLETED | OUTPATIENT
Start: 2022-10-18 | End: 2022-10-18

## 2022-10-18 RX ADMIN — LIDOCAINE HYDROCHLORIDE 5 ML: 10 INJECTION, SOLUTION EPIDURAL; INFILTRATION; INTRACAUDAL; PERINEURAL at 07:06

## 2022-10-18 RX ADMIN — IOPAMIDOL 15 ML: 612 INJECTION, SOLUTION INTRATHECAL at 07:10

## 2022-10-18 NOTE — POST-PROCEDURE NOTE
MYELOGRAM PROCEDURE NOTE  Neurosurgery    Patient: Rodolfo Hill  : 1960      PreOp Diagnosis: Cervical radiculopathy    PostOp Diagnosis: Neel    Procedure: Cervical myelogram    Surgeon: Max    Anesthesia: 1% lidocaine    Technique:   Spinal needle: 22 gauge   Contrast: Isovue 300 (15ml)   Injection site: L4    EBL: Trace    Specimens removed: None    Complication: None        Fahad Santana MD  10/18/22  07:14 EDT

## 2022-10-19 ENCOUNTER — TELEPHONE (OUTPATIENT)
Dept: INFUSION THERAPY | Facility: HOSPITAL | Age: 62
End: 2022-10-19

## 2022-11-02 ENCOUNTER — OFFICE VISIT (OUTPATIENT)
Dept: NEUROSURGERY | Facility: CLINIC | Age: 62
End: 2022-11-02

## 2022-11-02 ENCOUNTER — TELEPHONE (OUTPATIENT)
Dept: NEUROSURGERY | Facility: CLINIC | Age: 62
End: 2022-11-02

## 2022-11-02 VITALS — TEMPERATURE: 97.7 F | BODY MASS INDEX: 19.61 KG/M2 | WEIGHT: 122 LBS | HEIGHT: 66 IN

## 2022-11-02 DIAGNOSIS — M47.812 CERVICAL SPONDYLOSIS WITHOUT MYELOPATHY: ICD-10-CM

## 2022-11-02 DIAGNOSIS — M43.10 ACQUIRED SPONDYLOLISTHESIS: Primary | ICD-10-CM

## 2022-11-02 DIAGNOSIS — M50.30 DDD (DEGENERATIVE DISC DISEASE), CERVICAL: ICD-10-CM

## 2022-11-02 PROCEDURE — 99213 OFFICE O/P EST LOW 20 MIN: CPT | Performed by: NEUROLOGICAL SURGERY

## 2022-11-02 NOTE — PROGRESS NOTES
Patient: Rodolfo Hill  : 1960    Primary Care Provider: Elvis Garcia MD    Requesting Provider: As above        History    Chief Complaint: Neck and bilateral upper extremity pain with sensory alteration.    History of Present Illness: Mr. Hill is seen in follow up. He is a 62-year-old  who has a history of rheumatoid arthritis and osteoporosis.  In 2017 he underwent T9 and T8 kyphoplasty for osteoporotic compression fractures.  He has chronic neck pain for which he is treated with Neurontin, oxycodone, diazepam, and NSAIDs.    He smokes 1 pack/day of tobacco products.  He complains of neck pain that radiates into the right arm with sensory alteration in the thumb and index finger.  When I last saw him he was mostly complaining of right shoulder pain and limited range of motion.  He underwent shoulder surgery a couple months ago and has recovered well from that. He continues to complain of pain down the right arm, especially when turning his head to the right. He has noticed some  weakness lately on the left and right.   Today the pain continues to involve his right upper arm and he has sensory alteration extending into the right thumb and index finger.  He complains of intermittent numbness on the top of his left hand as well.     Prior to patient's recent shoulder surgery he underwent extensive work-up for recent unexplained weight loss.  Per patient, work-up was unrevealing.  He has been trying to focus on better nutrition    Review of Systems   Constitutional: Negative for activity change, appetite change, chills, diaphoresis, fatigue, fever and unexpected weight change.   HENT: Negative for congestion, dental problem, drooling, ear discharge, ear pain, facial swelling, hearing loss, mouth sores, nosebleeds, postnasal drip, rhinorrhea, sinus pressure, sinus pain, sneezing, sore throat, tinnitus, trouble swallowing and voice change.    Eyes: Negative for photophobia, pain,  "discharge, redness, itching and visual disturbance.   Respiratory: Negative for apnea, cough, choking, chest tightness, shortness of breath, wheezing and stridor.    Cardiovascular: Negative for chest pain, palpitations and leg swelling.   Gastrointestinal: Negative for abdominal distention, abdominal pain, anal bleeding, blood in stool, constipation, diarrhea, nausea, rectal pain and vomiting.   Endocrine: Negative for cold intolerance, heat intolerance, polydipsia, polyphagia and polyuria.   Genitourinary: Negative for decreased urine volume, difficulty urinating, dysuria, enuresis, flank pain, frequency, genital sores, hematuria and urgency.   Musculoskeletal: Positive for neck pain. Negative for arthralgias, back pain, gait problem, joint swelling, myalgias and neck stiffness.   Skin: Negative for color change, pallor, rash and wound.   Allergic/Immunologic: Negative for environmental allergies, food allergies and immunocompromised state.   Neurological: Positive for numbness. Negative for dizziness, tremors, seizures, syncope, facial asymmetry, speech difficulty, weakness, light-headedness and headaches.   Hematological: Negative for adenopathy. Does not bruise/bleed easily.   Psychiatric/Behavioral: Negative for agitation, behavioral problems, confusion, decreased concentration, dysphoric mood, hallucinations, self-injury, sleep disturbance and suicidal ideas. The patient is not nervous/anxious and is not hyperactive.    All other systems reviewed and are negative.      The patient's past medical history, past surgical history, family history, and social history have been reviewed at length in the electronic medical record.      Physical Exam:   Temp 97.7 °F (36.5 °C) (Infrared)   Ht 167.6 cm (65.98\")   Wt 55.3 kg (122 lb)   BMI 19.70 kg/m²   Reflexes are difficult to elicit throughout.  Nay signs are negative.    Medical Decision Making    Data Review:   (All imaging studies were personally reviewed " unless stated otherwise)  Cervical CT myelography demonstrates some hanging up of the contrast column at the C6-7 level.  Ultimately the dye got up higher and there is some nerve root truncation bilaterally at C4-5, C5-6, and C6-7.  Disc base narrowing and grade 1 listhesis is noted of C6 on C7.    Diagnosis:   For the most part I think this patient symptoms are emanating from the C5-6 and C6-7 levels.    Treatment Options:   We have discussed treatment options.  I have referred him for an epidural injection or 2.  He will follow-up in our clinic in about 6-8 weeks.  I have recommended smoking cessation.  If he is able to stop smoking and if his symptoms continue then he would be a candidate for ACDF C5-7.       Diagnosis Plan   1. Acquired spondylolisthesis  Ambulatory Referral to Pain Management      2. DDD (degenerative disc disease), cervical        3. Cervical spondylosis without myelopathy            Scribed for Fahad Santaan MD by BRUNILDA Henson 11/2/2022 12:36 EDT    I, Dr. Santana, personally performed the services described in the documentation, as scribed in my presence, and it is both accurate and complete.

## 2022-11-02 NOTE — TELEPHONE ENCOUNTER
Provider:  Max  Surgery/Procedure: ASPEN   Surgery/Procedure Date: NA   Last visit:   11/02/2022-Patient saw Dr. Santana today.   Next visit: 12/20/2022     Reason for call:    Patient LVM requesting that his myelogram results from 10/18/2022 be faxed to his PCP. Patient states the fax number is [phone].    I will fax in the AM.

## 2022-11-03 NOTE — TELEPHONE ENCOUNTER
"I s/w Rose and she said, \"His PCP will just need to fax over on a fax cover sheet the name date of birth and what they are requesting, then we can send it to them. \"    I called patient to let him know the above. Patient spoke to someone at PCP's office and she said she was able to handle it. I told patient if he has any issues to call us and let us know. He was thankful for the call back.   "

## 2022-12-20 ENCOUNTER — OFFICE VISIT (OUTPATIENT)
Dept: NEUROSURGERY | Facility: CLINIC | Age: 62
End: 2022-12-20

## 2022-12-20 VITALS
SYSTOLIC BLOOD PRESSURE: 108 MMHG | WEIGHT: 117 LBS | HEIGHT: 66 IN | DIASTOLIC BLOOD PRESSURE: 70 MMHG | BODY MASS INDEX: 18.8 KG/M2 | TEMPERATURE: 97.7 F

## 2022-12-20 DIAGNOSIS — M50.30 DDD (DEGENERATIVE DISC DISEASE), CERVICAL: ICD-10-CM

## 2022-12-20 DIAGNOSIS — M50.30 DEGENERATION OF CERVICAL INTERVERTEBRAL DISC: ICD-10-CM

## 2022-12-20 DIAGNOSIS — M47.812 CERVICAL SPONDYLOSIS WITHOUT MYELOPATHY: Primary | ICD-10-CM

## 2022-12-20 DIAGNOSIS — M54.10 RADICULOPATHY, UNSPECIFIED SPINAL REGION: ICD-10-CM

## 2022-12-20 DIAGNOSIS — Z71.6 ENCOUNTER FOR SMOKING CESSATION COUNSELING: ICD-10-CM

## 2022-12-20 DIAGNOSIS — M43.10 ACQUIRED SPONDYLOLISTHESIS: ICD-10-CM

## 2022-12-20 PROCEDURE — 99213 OFFICE O/P EST LOW 20 MIN: CPT | Performed by: PHYSICIAN ASSISTANT

## 2022-12-20 RX ORDER — FLUTICASONE FUROATE, UMECLIDINIUM BROMIDE AND VILANTEROL TRIFENATATE 200; 62.5; 25 UG/1; UG/1; UG/1
1 POWDER RESPIRATORY (INHALATION) DAILY
COMMUNITY
Start: 2022-11-29

## 2022-12-20 RX ORDER — LANOLIN ALCOHOL/MO/W.PET/CERES
CREAM (GRAM) TOPICAL
COMMUNITY
Start: 2022-12-07

## 2022-12-20 RX ORDER — FERROUS SULFATE 325(65) MG
TABLET ORAL
COMMUNITY
Start: 2022-11-29

## 2022-12-20 NOTE — PROGRESS NOTES
Patient: Rodolfo Hill  : 1960  Chart #: [id number]    Date of Service: 22    CHIEF COMPLAINT: Neck and right arm pain with sensory alteration      History of Present Illness Mr. Hill is seen in follow up. He is a 62-year-old  who has a history of rheumatoid arthritis and osteoporosis.  In 2017 he underwent T9 and T8 kyphoplasty for osteoporotic compression fractures.  He has chronic neck pain for which he is treated with Neurontin, oxycodone, diazepam, and NSAIDs.    He smokes 1 pack/day of tobacco products.  He complains of neck pain that radiates into the right arm with sensory alteration in the thumb and index finger.  When I last saw him he was mostly complaining of right shoulder pain and limited range of motion.  He underwent shoulder surgery and has recovered well from that. He continues to complain of pain down the right arm, especially when turning his head to the right. He has noticed some  weakness lately on the left and right.  Dr. Santana felt like symptoms were emanating from the C5-6 and C6-7 levels.  He would likely benefit from ACDF C5-7 however given his current smoking status we have put that on hold.  He was referred for epidural injections.  His first appointment is in a few weeks.  He is working on smoking cessation with his PCP.      Past Medical History:   Diagnosis Date   • Anxiety    • COPD (chronic obstructive pulmonary disease) (HCC)    • Hyperlipidemia    • Hypertension    • Myocardial infarct (HCC)    • Osteoporosis    • Rheumatoid arthritis (HCC)    • Sleep apnea with use of continuous positive airway pressure (CPAP)          Current Outpatient Medications:   •  ASPIR 81 MG EC tablet, TAKE 1 TABLET BY MOUTH EVERY DAY, Disp: , Rfl:   •  atorvastatin (LIPITOR) 40 MG tablet, , Disp: , Rfl:   •  baclofen (LIORESAL) 20 MG tablet, , Disp: , Rfl:   •  carvedilol (COREG) 12.5 MG tablet, Take 12.5 mg by mouth 2 (Two) Times a Day., Disp: , Rfl:   •   clotrimazole (LOTRIMIN) 1 % cream, Apply 1 application topically 2 (Two) Times a Day., Disp: , Rfl:   •  CVS D3 125 MCG (5000 UT) capsule capsule, , Disp: , Rfl:   •  diazePAM (VALIUM) 5 MG tablet, Take 5 mg by mouth 2 (Two) Times a Day As Needed for Anxiety., Disp: , Rfl:   •  diclofenac (VOLTAREN) 1 % gel gel, Apply 4 g topically 4 (Four) Times a Day As Needed., Disp: , Rfl:   •  ferrous sulfate 325 (65 FE) MG tablet, , Disp: , Rfl:   •  folic acid (FOLVITE) 1 MG tablet, , Disp: , Rfl:   •  gabapentin (NEURONTIN) 600 MG tablet, Take 600 mg by mouth 4 (Four) Times a Day., Disp: , Rfl:   •  lisinopril (PRINIVIL,ZESTRIL) 10 MG tablet, Take 10 mg by mouth Daily., Disp: , Rfl:   •  meloxicam (MOBIC) 15 MG tablet, Take 15 mg by mouth Daily., Disp: , Rfl:   •  nicotine polacrilex (NICORETTE) 4 MG gum, , Disp: , Rfl:   •  nortriptyline (PAMELOR) 25 MG capsule, Take 25 mg by mouth At Night As Needed., Disp: , Rfl:   •  oxyCODONE HCl 15 MG tablet , Take 15 mg by mouth Every 6 (Six) Hours As Needed., Disp: , Rfl:   •  pantoprazole (PROTONIX) 40 MG EC tablet, , Disp: , Rfl:   •  Tiotropium Bromide Monohydrate (SPIRIVA HANDIHALER IN), Inhale 2 puffs Daily., Disp: , Rfl:   •  Trelegy Ellipta 200-62.5-25 MCG/ACT aerosol powder , Inhale 1 puff Daily., Disp: , Rfl:   •  vitamin B-12 (CYANOCOBALAMIN) 1000 MCG tablet, TAKE 1 TABLET BY MOUTH EVERY DAY FOR 90 DAYS, Disp: , Rfl:     Past Surgical History:   Procedure Laterality Date   • CARPAL TUNNEL RELEASE     • KYPHOPLASTY N/A 12/01/2017    Procedure: KYPHOPLASTY T8-9;  Surgeon: Fahad Santana MD;  Location: [facility];  Service:    • SHOULDER SURGERY Left 2019    [facility]   • SHOULDER SURGERY Right 05/2022    [facility]       Social History     Socioeconomic History   • Marital status:    Tobacco Use   • Smoking status: Every Day     Packs/day: 1.00     Years: 40.00     Pack years: 40.00     Types: Cigarettes   • Smokeless tobacco:  "Never   • Tobacco comments:     Patient is trying to quit-he uses nicotine chewing gum 01/14/2022   Vaping Use   • Vaping Use: Never used   Substance and Sexual Activity   • Alcohol use: Not Currently     Comment: rarely   • Drug use: No   • Sexual activity: Defer         Review of Systems   Musculoskeletal: Positive for myalgias.   Neurological: Positive for weakness.   All other systems reviewed and are negative.      Objective   Vital Signs: Blood pressure 108/70, temperature 97.7 °F (36.5 °C), temperature source Infrared, height 167.6 cm (65.98\"), weight 53.1 kg (117 lb).  Physical Exam  Constitutional: He appears well-developed and well-nourished. No distress.   HENT:   Head: Normocephalic and atraumatic.   Eyes: EOM are normal.   Cardiovascular: Normal rate and regular rhythm.   Pulmonary/Chest: Effort normal and breath sounds normal.   Psychiatric: He has a normal mood and affect. His behavior is normal. Thought content normal.   Nursing note and vitals reviewed.  Musculoskeletal:  Station and gait are normal.  Experiences pain in the neck especially with rotation to the right.  Limited range of motion in the right shoulder.  Neurologic:  Muscle tone is normal throughout.  Coordination is intact.  Deep tendon reflexes: absent in the lower extremities.   Sensation is intact to light touch throughout.  Patient is oriented to person, place, and time.  Julian sign negative.      Independent review of radiographic imaging: MRI of the cervical spine dated 1/23/2022 was reviewed.  This demonstrates multilevel degenerative disc and joint disease.  At C6-7 there is an anterior listhesis with right greater than left neuroforaminal narrowing.  No evidence of cord compromise.  Imaging was also reviewed by Dr. Santana    EMG/NCV studies demonstrate bilateral C8-T1 radiculopathies.  Due to EMG changes in the right deltoid, cannot rule out chronic right C5-6 radiculopathy versus chronic changes from right shoulder issues.  " No electrophysical evidence for carpal tunnel syndrome, ulnar neuropathy, or myopathy       Assessment & Plan   Diagnosis:   1.  Cervical spondylosis with radiculopathy-likely emanating from C5-6 and C6/7 levels  2.  Chronic neck pain    3.  Right shoulder pain status post surgery, May 2022    Medical Decision Making: Patient is going to follow through with cervical epidural injections.  He has his first appointment a few weeks.  He needs to quit smoking prior to consideration of ACDF.  He is working with his PCP on this.  He will follow-up with me in a couple months to check his progress.        Diagnoses and all orders for this visit:    1. Cervical spondylosis without myelopathy (Primary)    2. Degeneration of cervical intervertebral disc    3. Radiculopathy, unspecified spinal region    4. Encounter for smoking cessation counseling    5. Acquired spondylolisthesis    6. DDD (degenerative disc disease), cervical                   I discussed >3m the need to STOP smoking, there is a direct correlation between smoking and wound healing and degenerative disc disease. Patient will take this under advisement and discuss with their primary provider.        Kate Manning PA-C  Patient Care Team:  Elvis Garcia MD as PCP - General (Family Medicine)  Fahad Bruno DO as Referring Physician (Pain Medicine)  Clayton Crawford MD (Orthopedic Surgery)

## 2023-02-17 NOTE — PROGRESS NOTES
"You have chosen to receive care through a telephone visit today. Do you consent to use a telephone visit for your medical care today?  Yes    Type of Service: Consult via telemedicine  Basis for telemedicine: COVID-19 pandemic/federally declared state of public health emergency  Mode of transmission: Telephone (patient's preference)  Telemedicine Provider: Gerry Dickson MD   Location of Physician: Office   Patient location: Home   Facilitator/intake: Brenda Go CMA      Chief Complaint: \"Neck pain, headaches, and right shoulder.\"       History of Present Illness:  Mr. Rodolfo Hill, 62 y.o. male originally referred by Dr. Elvis Garcia in consultation for chronic intractable neck pain.   Pain History: Patient presented reporting a longstanding history of chronic posterior cervicalgia and right shoulder pain. His history is quite complex. Patient is a  who has significant comorbidities including anxiety, depression, COPD, hyperlipidemia, hypertension, history of MI, osteoporosis, rheumatoid arthritis, sleep apnea on CPAP. In 2017, he underwent T8 and T9 kyphoplasties for treatment of compression fractures. For the past several years, he suffered other injuries. He went to the emergency room and was diagnosed with a distal clavicle fracture, rib fractures, and transverse process fractures of T1 and T2.  Patient reports predominantly axial mechanical neck pain, limited cervical ROM, and occipital headaches. Patient denies radicular symptoms, or any symptoms of myelopathy. MRI of the cervical spine revealed multilevel degenerative disc and facet joint disease without critical areas of stenosis. Anterolisthesis of C6 on C7. EMG/NCV of the upper extremities revealed some lower trunk left brachial plexus plexopathy and left median neuropathy at the wrist without correlation with current symptoms as he denies left upper extremity pain despite EMG/NCV findings. He was under the care of pain management, " "Dr. Hercules, in [place]. Patient reports that he had injections with Dr Helms without relief. It is unclear if he has been discharged by Analia Dempsey. He also reports that he was told by \"his bone doctor not to have more than 2 injections per year.\" He has been on gabapentin, oxycodone, diazepam, muscle relaxers for a long time prescribed by his primary care physician for his unrelenting pain. Rodolfo Hill underwent neurosurgical consultation with Dr. Santana and Kate Manning PA-C on 8/4/2020, and was found not to be a surgical candidate. Patient was referred to me and I saw him for his only visit on 10/28/2021.  During that visit, we discussed a comprehensive pain management program including interventional pain management measures. Specifically, we discussed the possibility of diagnostic bilateral cervical medial branch blocks at C5, C6, C7; for bilateral cervical facet joints at C5-C6, and C6-C7 to clarify the origin of chronic refractory mechanical/axial cervicalgia, and if beneficial, then, proceed with a second set of diagnostic blocks, to then proceed with bilateral cervical medial branch rhizotomies. We also discussed treatments for his chronic intractable occipital headaches including diagnostic and therapeutic bilateral greater occipital nerve blocks by hydrodissection technique under ultrasound and fluoroscopic guidance. Also, I spent a great deal of time talking about rehabilitative measures as well as counseling him on smoking cessation. Upon conclusion of his visit, he left stating that he needed to discuss with his wife the proposed treatment plan. He never called back for a follow-up appointment or with additional questions (please see plan of care from note from October 2021). At some point, he was evaluated by an orthopedic surgeon and in May 2022, he underwent right shoulder surgery, from which he recovered well but continued complaining of neck pain radiating down the right " arm, especially when turning his head to the right. He noticed  weakness. On 12/20/2022, he underwent follow-up neurosurgical consultation with Kate Manning PA-C and was found not to be a surgical candidate at that time. Patient has been advised to quit smoking prior to consideration of ACDF. Patient was referred back to me for consideration for interventional pain management measures including cervical epidural steroid injections. New diagnostic studies were obtained. EMG/NCV studies revealed bilateral C8-T1 radiculopathies. Due to EMG changes in the right deltoid, cannot rule out chronic right C5-C6 radiculopathy versus chronic changes from right shoulder issues. No electrophysical evidence for carpal tunnel syndrome, ulnar neuropathy, or myopathy. Cervical myelogram on 10/18/2022 revealed effacement of the nerve root sleeves bilaterally at C4-C5, C5-C6, and C6-C7. Moderate anterior compression of the thecal sac at the same levels.. CT scan postmyelogram on 10/18/2022 revealed multilevel spondylosis. At C5-C6: Disc osteophyte complex, facet hypertrophy.  Moderate spinal canal stenosis and foraminal stenosis.  At C6-C7: Disc osteophyte complex, facet hypertrophy.  Moderate to severe spinal canal stenosis and bilateral neuroforaminal stenosis greater on the right. Flexion and extension x-rays of the cervical spine on 3/2/2022 revealed anterolisthesis C6 and C7 unchanged in flexion and extension.  Multilevel spondylosis. Facet hypertrophy most notable at C5-C6 and C6-C7. MRI of the cervical spine without contrast on 01/20/2022 revealed diffuse cervical spondylosis most pronounced at C6-C7: Posterior disc osteophyte complex contributing to severe right greater than left neuroforaminal stenosis.  There appears to be clinical and radiological correlation of radiculopathy originating from the C5-C6 and C6-C7, and therefore, patient could be a potential candidate for ACDF C5-C7.  Due to his heavy smoking  habits, surgery will have to be put on hold due to increase risk for postoperative complications. Patient has been referred back in consultation for interventional pain management measures.  He is working on smoking cessation with his PCP. Pain has continued to increase in intensity over the past years.    Pain Description: Constant posterior neck pain with intermittent exacerbation, described as aching, burning and stabbing sensation.   Radiation of Pain: The pain radiates into the occipital region and the right shoulder blade and down the right upper extremity to the elbow  Pain intensity today: 10/10  Average pain intensity last week: 9/10  Pain intensity ranges from: 9/10 to 10/10  Aggravating factors: Pain increases with extension, rotation of the cervical spine   Alleviating factors: Pain decreases with rest and analgesics  Associated Symptoms:   Patient reports pain, numbness, and weakness in the right arm to the elbow and burning in his left hand. He also reports lower back pain and pain in his legs.  Patient denies any new bladder or bowel problems other than urinary flow issues.   Patient denies difficulties with his balance or recent falls.   Patient reports daily bilateral cervicogenic and occipital headaches lasting several hours.    Review of previous therapies and additional medical records:  Rodolfo Hill has already failed the following measures, including:   Conservative Measures: Oral analgesics, opioids, topical analgesics, ice, heat, chiropractic therapy, physical therapy   Interventional Measures: Injections with Dr. Helms. Records are not available for review  Surgical Measures: No history of previous cervical spine surgery.   2017: kyphoplasty T8-T9 by Dr. Santana.    2019: Left rotator cuff repair.   2022: Right shoulder replacement by Dr. Cruz  History of carpal tunnel release.    Rodolfo Hill underwent neurosurgical consultation with Dr. Santana and Kate Boyd  [place] Manning on 12/20/2022, and was found to be a potential surgical candidate for C5-C7 ACDF.  Rodolfo Hill presents with significant comorbidities including anxiety, depression, COPD, hyperlipidemia, hypertension, history of MI, osteoporosis, rheumatoid arthritis, sleep apnea on CPAP  In terms of current analgesics, Rodolfo Hill takes: Baclofen, diclofenac gel, gabapentin, meloxicam, oxycodone.  Patient also takes diazepam on medications as prescribed by his primary care physician  I have reviewed Luis Report consistent with medication reconciliation.  SOAPP: Not completed by the patient    Global Pain Scale 10-28  2021 02-23 2023         Pain 21 24         Feelings 7 15         Clinical outcomes 20 22         Activities 8 23         GPS Total: 56 84           NECK PAIN DISABILITY INDEX QUESTIONNAIRE  DATE 02-23 2023           Pain intensity  0: No pain  1: Mild  2: Moderate  3: Fairly severe  4: very severe  5: Worst imaginable 5           Personal Care   0: I can look after myself normally without causing extra pain.  1: I can look after myself normally, but it causes extra pain.  2: It is painful to look after myself and I am slow and careful.  3: I need some help, but manage most of my personal care.  4: I need help every day in most aspects of self-care.  5: I do not get dressed; I wash with difficulty and stay in bed. 5            Lifting  0: I can lift heavy weights without extra pain.  1: I can lift heavy weights, but it gives extra pain.  2: Pain prevents me from lifting heavy weights off the floor but I can manage if they are conveniently positioned, for example, on a table.  3: Pain prevents me from lifting heavy weights, but I can manage light to medium weights if they are conveniently positioned.  4: I can lift very light weights.  5: I cannot lift or carry anything at all. 5           Reading  0: I can read as much as I want to with no pain in my neck.  1: I can read as much as  I want to with slight pain in my neck.  2: I can read as much as I want to with moderate pain in my neck.  3: I cannot read as much as I want because of moderate pain in my neck.  4: I cannot read as much as I want because of severe pain in my neck.  5: I cannot read at all. 5           Headaches  0: I have no headaches at all.  1: I have slight headaches which come infrequently.  2: I have moderate headaches which come infrequently.  3: I have moderate headaches which come frequently.  4: I have severe headaches which come frequently.  5: I have headaches almost all the time. 5           Concentration  0: I can concentrate fully when I want to with no difficulty.  1: I can concentrate fully when I want to with slight difficulty.  2: I have a fair degree of difficulty in concentrating when I want to.  3: I have a lot of difficulty in concentrating when I want to.  4: I have a great deal of difficulty in concentrating when I want to.  5: I cannot concentrate at all. 5           Work  0: I can do as much work as I want to.  1: I can only do my usual work, but no more.  2: I can do most of my usual work, but no more.  3: I cannot do my usual work.  4: I can hardly do any work at all.  5: I cannot do any work at all. 5           Driving  0: I can drive my car without any neck pain.  1: I can drive my car as long as I want with slight pain in my neck.  2: I can drive my car as long as I want with moderate pain in my   neck.  3: I cannot drive my car as long as I want because of moderate pain   in my neck.  4: I can hardly drive at all because of severe pain in my neck.  5: I cannot drive my car at all. 5           Sleeping  0: I have no trouble sleeping.  1: My sleep is slightly disturbed (less than 1 hour sleepless).  2: My sleep is mildly disturbed (1-2 hours sleepless).  3: My sleep is moderately disturbed (2-3 hours sleepless).  4: My sleep is greatly disturbed (3-5 hours sleepless).  5: My sleep is completely  disturbed (5-7 hours) 5            Recreation  0: I am able to engage in all of my recreational activities with no neck pain at all.  1: I am able to engage in all of my recreational activities with some pain in my neck.  2: I am able to engage in most, but not all of my recreational activities because of pain in my neck.  3: I am able to engage in a few of my recreational activities because of pain in my neck.  4: I can hardly do any recreational activities because of pain in my neck.  5: I cannot do any recreational activities at all. 5           TOTAL SCORE 50             Shoulder Pain and Disability Index (SPADI)   PAIN SCALE:   How severe is your pain?   Pain scale 0/10 to 10/10 02-23 2023      What number describes your pain at its worst?  7      When lying on the involved side? 5      Reaching for something on a high shelf?  2      Touching the back of your neck? 5      Pushing with the involved arm? 2      Total Pain Score (MAX 50) 21             DISABILITY SCALE:   How much difficulty do you have?  Difficulty scale 0/10 to 10/10        Washing your hair? 8      Washing your back? 8      Putting on an undershirt or jumper?  5      Putting on a shirt that buttons down the front?  5      Putting on your pants?  5      Placing an object on a high shelf?  2      Carrying a heavy object of 10 pounds (4.5 KG) 2      Removing something from your back pocket?  2      Total Disability Score (MAX 80) 37             TOTAL SPADI SCORE () 58          Review of New Diagnostic Studies:  I have reviewed the images with the patient and used spine model to explain findings.  I also reviewed the reports.    EMG/NCV studies demonstrate bilateral C8-T1 radiculopathies.  Possible chronic right C5-6 radiculopathy versus chronic changes from right shoulder issues.  No electrophysical evidence for carpal tunnel syndrome, ulnar neuropathy, or myopathy  Cervical myelogram on 10/18/2022 revealed effacement of the nerve root  sleeves bilaterally at C4-C5 C5-C6 and C6-C7.  Moderate anterior compression of the thecal sac at the same levels.  CT scan postmyelogram on 10/18/2022 revealed multilevel spondylosis.  Vertebral body heights and alignment are preserved.  Axial imaging:  C2-C3: No significant canal or foraminal stenosis  C3-C4: Disc osteophyte complex, facet hypertrophy.  Mild canal and mild foraminal stenosis  C4-C5: Disc osteophyte complex, facet hypertrophy.  Mild to moderate canal stenosis and foraminal stenosis  C5-C6: Disc osteophyte complex, facet hypertrophy.  Moderate spinal canal stenosis and foraminal stenosis.    C6-C7: Disc osteophyte complex, facet hypertrophy.  Moderate to severe spinal canal stenosis and bilateral neuroforaminal stenosis greater on the right  Flexion-extension x-rays of the cervical spine on 3/2/2022 revealed anterolisthesis C6 and C7 unchanged in flexion and extension.  Multilevel spondylosis.  Facet hypertrophy most notable at C5-C6 and C6-7  MRI of the cervical spine without contrast on 1/20/2022 revealed diffuse cervical spondylosis most pronounced at C6-C7.  Spinal cord caliber and signal are preserved.  Axial imaging:  C2-C3: No significant canal or foraminal stenosis  C3-C4: Small posterior disc osteophyte complex.  No significant canal or foraminal stenosis  C4-C5: Posterior disc osteophyte complex.  No significant canal or foraminal stenosis  C5-6: Disc osteophyte complex.  No significant canal or foraminal stenosis  C6-7: Posterior disc osteophyte complex contributing to severe right greater than left neuroforaminal stenosis    Review of Previous Diagnostic Studies:  I have reviewed the images with the patient and used spine model to explain findings.  I also reviewed the reports.    EMG/NCV by Dr. Larry Maddox on 8/4/2020.  Evidence of an axonal injury to the lower trunk of the left brachial plexus and mild median neuropathy at the wrist  CT cervical spine without contrast 8/4/2020.   Grade 1 early grade 2 subluxation of C6 and C7 with advanced degenerative disc and facet joint disease.  Vertebral body heights and disc spaces are maintained.  Moderate atlantoaxial degenerative joint disease.  The dens and the ring of C1 are normally aligned.  Moderate levoconvex scoliosis.  Axial images: Broad-based disc protrusion at C6-7 with mild to moderate canal stenosis.  No evidence of high-grade stenosis elsewhere.  MRI of the cervical spine without contrast on 7/2/2020.  Vertebral body heights are maintained.  Axial imaging:  C3-C4: Disc osteophyte complex, facet arthropathy.  Mild canal and mild left foraminal stenosis  C4-C5: Disc osteophyte complex, facet arthropathy, mild to moderate canal stenosis and mild left and moderate right foraminal stenosis  C5-C6: Posterior disc osteophyte complex, facet hypertrophy.  Mild canal stenosis and moderate left and severe right foraminal stenosis  C6-C7: Anterolisthesis, broad-based disc osteophyte complex, facet hypertrophy contributing to moderate spinal canal stenosis and severe bilateral neuroforaminal stenosis  C7-T1: No significant canal or foraminal stenosis    Review of Systems   Musculoskeletal: Positive for arthralgias, neck pain and neck stiffness.   All other systems reviewed and are negative.        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   • Cervical radiculopathy       Past Medical History:   Diagnosis Date   • Anxiety    • COPD (chronic obstructive pulmonary  disease) (HCC)    • Hyperlipidemia    • Hypertension    • Myocardial infarct (HCC)    • Osteoporosis    • Rheumatoid arthritis (HCC)    • Sleep apnea with use of continuous positive airway pressure (CPAP)          Past Surgical History:   Procedure Laterality Date   • CARPAL TUNNEL RELEASE     • KYPHOPLASTY N/A 12/01/2017    Procedure: KYPHOPLASTY T8-9;  Surgeon: Fahad Santana MD;  Location: Replaced by [facility];  Service:    • SHOULDER SURGERY Left 2019    [facility]   • SHOULDER SURGERY Right 05/2022    [facility]         Family History   Problem Relation Age of Onset   • Cancer Father          Social History     Socioeconomic History   • Marital status:    Tobacco Use   • Smoking status: Every Day     Packs/day: 1.00     Years: 40.00     Pack years: 40.00     Types: Cigarettes   • Smokeless tobacco: Never   • Tobacco comments:     Patient is trying to quit-he uses nicotine chewing gum 01/14/2022   Vaping Use   • Vaping Use: Never used   Substance and Sexual Activity   • Alcohol use: Not Currently     Comment: rarely   • Drug use: No   • Sexual activity: Defer           Current Outpatient Medications:   •  ASPIR 81 MG EC tablet, TAKE 1 TABLET BY MOUTH EVERY DAY, Disp: , Rfl:   •  atorvastatin (LIPITOR) 40 MG tablet, , Disp: , Rfl:   •  baclofen (LIORESAL) 20 MG tablet, , Disp: , Rfl:   •  carvedilol (COREG) 12.5 MG tablet, Take 12.5 mg by mouth 2 (Two) Times a Day., Disp: , Rfl:   •  clotrimazole (LOTRIMIN) 1 % cream, Apply 1 application topically 2 (Two) Times a Day., Disp: , Rfl:   •  [employer] D3 125 MCG (5000 UT) capsule capsule, , Disp: , Rfl:   •  diazePAM (VALIUM) 5 MG tablet, Take 5 mg by mouth 2 (Two) Times a Day As Needed for Anxiety., Disp: , Rfl:   •  diclofenac (VOLTAREN) 1 % gel gel, Apply 4 g topically 4 (Four) Times a Day As Needed., Disp: , Rfl:   •  ferrous sulfate 325 (65 FE) MG tablet, , Disp: , Rfl:   •  folic acid (FOLVITE) 1 MG tablet, , Disp: , Rfl:   •   gabapentin (NEURONTIN) 600 MG tablet, Take 600 mg by mouth 4 (Four) Times a Day., Disp: , Rfl:   •  lisinopril (PRINIVIL,ZESTRIL) 10 MG tablet, Take 10 mg by mouth Daily., Disp: , Rfl:   •  meloxicam (MOBIC) 15 MG tablet, Take 15 mg by mouth Daily., Disp: , Rfl:   •  nicotine polacrilex (NICORETTE) 4 MG gum, , Disp: , Rfl:   •  oxyCODONE HCl 15 MG tablet , Take 15 mg by mouth Every 6 (Six) Hours As Needed., Disp: , Rfl:   •  pantoprazole (PROTONIX) 40 MG EC tablet, , Disp: , Rfl:   •  Trelegy Ellipta 200-62.5-25 MCG/ACT aerosol powder , Inhale 1 puff Daily., Disp: , Rfl:   •  vitamin B-12 (CYANOCOBALAMIN) 1000 MCG tablet, TAKE 1 TABLET BY MOUTH EVERY DAY FOR 90 DAYS, Disp: , Rfl:       Allergies   Allergen Reactions   • Alupent [Metaproterenol] Arrhythmia     Chest pain, increased heart rate.         There were no vitals taken for this visit.      Physical Exam:  Due to the constraints of a telehealth format, a physical examination was not performed.  I have reviewed the findings of the last physical examination by Kate Cook PA-C and examination I performed him during his first visit.:  Musculoskeletal: Gait and station are normal.  Pain in the neck with range of motion, particularly rotation.  Limited range of motion of the right shoulder.  Neurologic:  Muscle tone: Normal  Coordination: Intact  DTRs: Absent in the lower extremities  Sensation: Intact to light touch throughout  Julian sign negative  Psychiatric mood and affect, appropriate    ASSESSMENT:   1. Cervical radiculopathy    2. 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 Cervical spondylosis without myelopathy    4. Occipital neuralgia, unspecified laterality    5. Rheumatoid arthritis involving multiple sites, unspecified whether rheumatoid factor present (HCC)    6. Anxiety and depression    7. Gait disturbance    8. Physical deconditioning    9. Current every day smoker    10. Encounter for smoking cessation counseling         PLAN/MEDICAL DECISION MAKING:  Rodolfo Hill reports a longstanding history of chronic posterior cervicalgia and right shoulder pain. His chronic pain and medical history are quite complex. Patient presents with significant comorbidities including anxiety, depression, COPD, hyperlipidemia, hypertension, history of MI, osteoporosis, rheumatoid arthritis, sleep apnea on CPAP. A few years ago, he presented with predominantly axial mechanical posterior neck pain, limited cervical ROM, and occipital headaches. He denied radicular symptoms or symptoms of myelopathy. His initial MRI of the cervical spine revealed multilevel degenerative disc and facet joint disease without areas of critical stenosis. Anterolisthesis of C6 on C7. EMG/NCV of the upper extremities revealed some lower trunk left brachial plexus plexopathy and left median neuropathy at the wrist without correlation with current symptoms as he denied left upper extremity pain despite EMG/NCV findings. He had been under the care of pain management, Dr. Hercules, in [place] and had injections. He has been on gabapentin, oxycodone, diazepam, muscle relaxers for a long time prescribed by his primary care physician for treatment of his unrelenting pain. Rodolfo Hill underwent neurosurgical consultation with Dr. Santana and Kate Manning PA-C on 8/4/2020, and was found not to be a surgical candidate. I evaluated him on 10/28/2021. During that visit, we discussed a comprehensive pain management program including interventional pain management measures. Specifically, we discussed diagnostic bilateral cervical medial branch blocks to clarify the origin of chronic refractory mechanical/axial cervicalgia, and if beneficial, then, proceed with a second set of diagnostic blocks, to then proceed with bilateral cervical medial branch rhizotomies. I spent a great deal of time talking about rehabilitative measures as well as counseling him on  smoking cessation. Upon conclusion of his visit, he decided not to pursue the proposed plan of care. In May 2022, he underwent right shoulder surgery, from which he recovered well but continued complaining of mechanical neck pain radiating down the right shoulder and into the occipital region causing occipital headaches. On 12/20/2022, he underwent follow-up neurosurgical consultation with Kate Manning PA-C and was found not to be a surgical candidate at that time. Patient was counseled to quit smoking prior to consideration of ACDF C5-C7. Patient was referred back to me for consideration of interventional pain management measures. New diagnostic studies were obtained. EMG/NCV studies revealed bilateral C8-T1 radiculopathies. Due to EMG changes in the right deltoid, cannot rule out chronic right C5-C6 radiculopathy versus chronic changes from right shoulder issues. No electrophysical evidence for carpal tunnel syndrome, ulnar neuropathy, or myopathy. Cervical myelogram on 10/18/2022 revealed effacement of the nerve root sleeves bilaterally at C4-C5, C5-C6, and C6-C7. Moderate anterior compression of the thecal sac at the same levels. CT scan postmyelogram on 10/18/2022 revealed multilevel spondylosis with facet hypertrophy most notable at C5-C6 and C6-C7. At C5-C6: Disc osteophyte complex, facet hypertrophy.  Moderate spinal canal stenosis and foraminal stenosis.  At C6-C7: Disc osteophyte complex, facet hypertrophy.  Moderate to severe spinal canal stenosis, bilateral neuroforaminal stenosis greater on the right. Flexion and extension x-rays of the cervical spine on 3/2/2022 revealed anterolisthesis C6 and C7 unchanged in flexion and extension. MRI of the cervical spine without contrast on 01/20/2022 revealed diffuse cervical spondylosis most pronounced at C6-C7: Posterior disc osteophyte complex contributing to severe right greater than left neuroforaminal stenosis. Patient could be a potential candidate  for ACDF C5-C7, per NS.  Due to his heavy smoking habits, surgery will have to be put on hold due to increase risk for postoperative complications. Patient has been referred back in consultation for interventional pain management measures.  He is working on smoking cessation with his PCP. His posterior cervicalgia and restricted ROM continued to increase over the past years. He appears to be dealing with different sources of his chronic pain.  At the request of the NS team, we will start treatment of cervical radicular pain. A comprehensive evaluation including history and physical exam along with pertinent physiologic and functional assessment was performed. Patient presents with intractable pain due to the diagnoses listed above. Patient has failed to respond to conservative modalities and previous surgical interventions, as referenced under HPI including the impact of patient's moderate-to-severe pain contributing to significant impairment in daily activities, ADLs, and a negative impact on quality of life. Supporting diagnostic studies of patient's chronic pain condition have been reviewed. I have reviewed all available patient's medical records as well as previous therapies as referenced above. I had a lengthy conversation with Rodolfo Hill'regarding his chronic pain condition and potential therapeutic options including risks, benefits, alternative therapies, to name a few. We have discussed using a stepwise approach starting with the shortest or least intense level of treatment, care, or service as determined by the extent required to diagnose and or treat patient's condition. The treatments proposed are consistent with the patient's medical condition and are known to be as safe and effective by current guidelines and standard of care. There is no evidence of absolute contraindications for the proposed procedures under the current circumstances. These treatments are not considered experimental or  investigational. The duration and frequency proposed are considered appropriate for the service in accordance with accepted standards of medical practice for the diagnosis and or treatment of the patient's condition and or intended to improve the patient's level of function. These services will be furnished in a setting appropriate to the patient's medical needs and condition. Therefore, I have proposed the following plan:  1. Interventional pain management measures: Patient will be scheduled for cervical epidural steroid injection by interlaminar approach using the lowest effective dose of steroids, under C-arm fluoroscopic guidance, with the use of contrast dye (unless contraindicated) to confirm appropriate needle placement and spread of contrast dye. We may repeat cervical epidural steroid injection by interlaminar approach versus diagnostic and therapeutic right Vs left C5-C6 and right Vs left C6-C7 transforaminal epidural steroid injections depending on patient's outcome.  As per current guidelines, epidurals will be limited to a maximum of 4 sessions per spinal region in a rolling twelve (12) month period. Continuation of epidural steroid injections over 12 months would only be considered under the following provisions;  • Patient is a high-risk surgical candidate, or the patient does not desire surgery, or recurrence of pain in the same location relieved with ESIs for at least three months and epidural provides at least 50% sustained improvement of pain and/or 50% objective improvement in function (using same scale as baseline)  • Pain is severe enough to cause a significant degree of functional disability or vocational disability  • The primary care provider will be notified regarding continuation of procedures and repeat steroid use   Patient also presents with chronic unrelenting moderate to severe axial mechanical cervical spine facetogenic pain that causes functional deficit measured on pain scales and  or functional and disability scales. Pain has been present for 5 years. Rodolfo Hill average pain level for the past months has been rated as 7/10 ranging from 5/10 to 10/10. Therefore, we could consider a first set of diagnostic bilateral cervical medial branch blocks at C5, C6, C7; for bilateral cervical facet joints at C5-C6, and C6-C7 to clarify the origin of chronic refractory mechanical/axial cervicalgia. If patient experiences 80% or more pain relief along with significant functional improvement and increase in the range of motion of the cervical spine, then, patient will be scheduled for a second set of diagnostic bilateral cervical medial branch blocks, to then, proceed with bilateral cervical medial branch rhizotomies.  Other options for treatment of his unrelenting occipital headaches, would include the possibility of diagnostic and therapeutic bilateral greater occipital nerve blocks by hydrodissection technique under ultrasound fluoroscopic guidance.  We discussed maximizing efforts to assist with pain control while he continues to make efforts on his smoking cessation that would be a requisite for him to have surgery if necessary.  Patient will follow-up with Dr. Santana thereafter.  2. Diagnostic studies: None indicated at this time, although patient states that a new EMG/NCV has been ordered to determine carpal tunnel syndrome.  3. Pharmacological measures: Reviewed and discussed; Patient takes baclofen, diclofenac gel, gabapentin, meloxicam, oxycodone.  Patient also takes diazepam on medications as prescribed by his primary care physician.   4. Long-term rehabilitation efforts:  A. The patient denies a history of falls but has risk factors for falls. Fall precautions: Patient has been instructed regarding universal fall precautions, such as;   • Using gait aids at the appropriate height at all times for ambulation   • Removing all area rugs and coffee tables to create a safe environment at  home  • Ensure clean, dry floors  • Wearing supportive footwear and properly fitting clothing  • Ensure bed/chair is appropriate height and patient's feet can touch the floor  • Using a shower transfer bench  • Using walk-in shower and having shower safety bars installed  • Ensure proper lighting, minimize glare  • Have nightlights operational and in use  • Participation in an exercise program for gait training, balance training and strength  • Avoid carrying laundry up and down steps  • Ensure proper compliance and organization of medications to avoid errors   • Avoid use of over the counter sedatives and alcohol consumption  • Ensure easy access to call bell, glasses, TV control, telephone  • Ensure glasses/hearing aids are in use or close by (on top of night table)  B. Patient will start a comprehensive physical therapy program for upper body strengthening/posture correction, core strengthening, gait and balance training, neurodynamics, ultrasound, ASTYM, E-STIM, myofascial release, cupping, dry needling, posture/position body mechanics, home exercise program  C. Continue HEP  D. Start an exercise program such as yoga  E. Contrast therapy: Apply ice-packs for 15-20 minutes, followed by heating pads for 15-20 minutes to affected area   F. Rodolfo Hill  reports that he has been smoking cigarettes. He has a 40.00 pack-year smoking history. He has never used smokeless tobacco.   I have educated him on the risk of diseases from using tobacco products such as cancer, COPD and heart disease.  I advised him to quit and he is willing to quit.  He is currently working with his primary care physician on his mobilization. I spent 3  minutes counseling the patient.  5. The patient has been instructed to contact my office with any questions or difficulties. The patient understands the plan and agrees to proceed accordingly.    The patient has a documented plan of care to address chronic pain. Rodolfo Brizuelaley  reports a pain score of  10/10.  Given his pain assessment as noted, treatment options were discussed and the following options were decided upon as a follow-up plan to address the patient's pain: continuation of current treatment plan for pain, educational materials on pain management, home exercises and therapy, prescription for non-opiod analgesics, referral to Physical Therapy, referral to specialist for assistance in pain treatment guidance, steroid injections, use of non-medical modalities (ice, heat, stretching and/or behavior modifications) and Interventional pain management measures.      Pain Management Panel    There is no flowsheet data to display.       YOSHI query complete. YOSHI reviewed by Gerry Dickson MD.     Time spent reviewing diagnostic studies, consultation reports, previous treatments, pre-chartin minutes  Time spent speaking with the patient: 19 minutes  Time spent ordering diagnostic studies, referrals, and writing this note: 15 minutes  Total Time: 40 minutes    Patient verbalizes understanding of medication dosage, comfort measures, instructions for treatment, and follow-up.    Please note that portions of this note were completed with a voice recognition program.     Gerry Dickson MD      Patient Care Team:  Elvis Garcia MD as PCP - General (Family Medicine)  Yvette, Clayton Garcia MD (Orthopedic Surgery)  Gerry Dickson MD as Consulting Physician (Pain Medicine)     No orders of the defined types were placed in this encounter.        Future Appointments   Date Time Provider Department Center   3/1/2023 10:30 AM Gerry Dickson MD [facility] Dickson MD     EMR Dragon/Transcription disclaimer: Much of this encounter note is an electronic transcription of spoken language to printed text.

## 2023-02-18 PROBLEM — M54.12 CERVICAL RADICULOPATHY: Status: ACTIVE | Noted: 2023-02-18

## 2023-02-18 PROBLEM — M99.41 CONNECTIVE TISSUE STENOSIS OF NEURAL CANAL OF CERVICAL REGION: Status: ACTIVE | Noted: 2023-02-18

## 2023-02-23 ENCOUNTER — OFFICE VISIT (OUTPATIENT)
Dept: PAIN MEDICINE | Facility: CLINIC | Age: 63
End: 2023-02-23
Payer: MEDICAID

## 2023-02-23 DIAGNOSIS — M54.12 CERVICAL RADICULOPATHY: ICD-10-CM

## 2023-02-23 DIAGNOSIS — F41.9 ANXIETY AND DEPRESSION: ICD-10-CM

## 2023-02-23 DIAGNOSIS — M54.12 CERVICAL RADICULOPATHY: Primary | ICD-10-CM

## 2023-02-23 DIAGNOSIS — F32.A ANXIETY AND DEPRESSION: ICD-10-CM

## 2023-02-23 DIAGNOSIS — M47.812 CERVICAL SPONDYLOSIS WITHOUT MYELOPATHY: ICD-10-CM

## 2023-02-23 DIAGNOSIS — M99.41 CONNECTIVE TISSUE STENOSIS OF NEURAL CANAL OF CERVICAL REGION: ICD-10-CM

## 2023-02-23 DIAGNOSIS — M54.81 OCCIPITAL NEURALGIA, UNSPECIFIED LATERALITY: ICD-10-CM

## 2023-02-23 DIAGNOSIS — R26.9 GAIT DISTURBANCE: ICD-10-CM

## 2023-02-23 DIAGNOSIS — M06.9 RHEUMATOID ARTHRITIS INVOLVING MULTIPLE SITES, UNSPECIFIED WHETHER RHEUMATOID FACTOR PRESENT: ICD-10-CM

## 2023-02-23 DIAGNOSIS — F17.200 CURRENT EVERY DAY SMOKER: ICD-10-CM

## 2023-02-23 DIAGNOSIS — R53.81 PHYSICAL DECONDITIONING: ICD-10-CM

## 2023-02-23 DIAGNOSIS — Z71.6 ENCOUNTER FOR SMOKING CESSATION COUNSELING: ICD-10-CM

## 2023-02-23 PROCEDURE — 99443 PR PHYS/QHP TELEPHONE EVALUATION 21-30 MIN: CPT | Performed by: ANESTHESIOLOGY

## 2023-09-29 ENCOUNTER — ANESTHESIA EVENT (OUTPATIENT)
Dept: PERIOP | Facility: HOSPITAL | Age: 63
End: 2023-09-29
Payer: MEDICAID

## 2023-09-29 ENCOUNTER — ANESTHESIA EVENT CONVERTED (OUTPATIENT)
Dept: ANESTHESIOLOGY | Facility: HOSPITAL | Age: 63
End: 2023-09-29
Payer: MEDICAID

## 2023-09-29 ENCOUNTER — ANESTHESIA (OUTPATIENT)
Dept: PERIOP | Facility: HOSPITAL | Age: 63
End: 2023-09-29
Payer: MEDICAID

## 2023-09-29 ENCOUNTER — APPOINTMENT (OUTPATIENT)
Dept: GENERAL RADIOLOGY | Facility: HOSPITAL | Age: 63
End: 2023-09-29
Payer: MEDICAID

## 2023-09-29 ENCOUNTER — HOSPITAL ENCOUNTER (OUTPATIENT)
Facility: HOSPITAL | Age: 63
Discharge: HOME | End: 2023-09-30
Attending: ORTHOPAEDIC SURGERY | Admitting: ORTHOPAEDIC SURGERY
Payer: MEDICAID

## 2023-09-29 DIAGNOSIS — M25.519 SHOULDER PAIN: ICD-10-CM

## 2023-09-29 DIAGNOSIS — M54.12 CERVICAL RADICULOPATHY: ICD-10-CM

## 2023-09-29 PROBLEM — J44.9 COPD (CHRONIC OBSTRUCTIVE PULMONARY DISEASE): Status: ACTIVE | Noted: 2023-09-29

## 2023-09-29 PROBLEM — M54.9 CHRONIC BACK PAIN: Status: ACTIVE | Noted: 2023-09-29

## 2023-09-29 PROBLEM — E78.5 HYPERLIPIDEMIA: Status: ACTIVE | Noted: 2023-09-29

## 2023-09-29 PROBLEM — G89.29 CHRONIC BACK PAIN: Status: ACTIVE | Noted: 2023-09-29

## 2023-09-29 PROBLEM — I10 HYPERTENSION: Status: ACTIVE | Noted: 2023-09-29

## 2023-09-29 PROBLEM — T84.019A FAILED ARTHROPLASTY: Status: ACTIVE | Noted: 2023-09-29

## 2023-09-29 PROBLEM — G47.30 SLEEP APNEA: Status: ACTIVE | Noted: 2023-09-29

## 2023-09-29 LAB
ABO GROUP BLD: NORMAL
ABO GROUP BLD: NORMAL
ALBUMIN SERPL-MCNC: 4 G/DL (ref 3.5–5.2)
ALBUMIN/GLOB SERPL: 1.3 G/DL
ALP SERPL-CCNC: 85 U/L (ref 39–117)
ALT SERPL W P-5'-P-CCNC: 13 U/L (ref 1–41)
ANION GAP SERPL CALCULATED.3IONS-SCNC: 8 MMOL/L (ref 5–15)
AST SERPL-CCNC: 18 U/L (ref 1–40)
BASOPHILS # BLD AUTO: 0.04 10*3/MM3 (ref 0–0.2)
BASOPHILS NFR BLD AUTO: 0.6 % (ref 0–1.5)
BILIRUB SERPL-MCNC: 0.6 MG/DL (ref 0–1.2)
BLD GP AB SCN SERPL QL: NEGATIVE
BUN SERPL-MCNC: 15 MG/DL (ref 8–23)
BUN/CREAT SERPL: 19.7 (ref 7–25)
CALCIUM SPEC-SCNC: 9.4 MG/DL (ref 8.6–10.5)
CHLORIDE SERPL-SCNC: 100 MMOL/L (ref 98–107)
CO2 SERPL-SCNC: 32 MMOL/L (ref 22–29)
CREAT SERPL-MCNC: 0.76 MG/DL (ref 0.76–1.27)
DEPRECATED RDW RBC AUTO: 46 FL (ref 37–54)
EGFRCR SERPLBLD CKD-EPI 2021: 100.4 ML/MIN/1.73
EOSINOPHIL # BLD AUTO: 0.35 10*3/MM3 (ref 0–0.4)
EOSINOPHIL NFR BLD AUTO: 5.6 % (ref 0.3–6.2)
ERYTHROCYTE [DISTWIDTH] IN BLOOD BY AUTOMATED COUNT: 13.2 % (ref 12.3–15.4)
GLOBULIN UR ELPH-MCNC: 3.1 GM/DL
GLUCOSE SERPL-MCNC: 87 MG/DL (ref 65–99)
HCT VFR BLD AUTO: 37.4 % (ref 37.5–51)
HGB BLD-MCNC: 11.9 G/DL (ref 13–17.7)
IMM GRANULOCYTES # BLD AUTO: 0.02 10*3/MM3 (ref 0–0.05)
IMM GRANULOCYTES NFR BLD AUTO: 0.3 % (ref 0–0.5)
LYMPHOCYTES # BLD AUTO: 0.72 10*3/MM3 (ref 0.7–3.1)
LYMPHOCYTES NFR BLD AUTO: 11.5 % (ref 19.6–45.3)
MCH RBC QN AUTO: 30.8 PG (ref 26.6–33)
MCHC RBC AUTO-ENTMCNC: 31.8 G/DL (ref 31.5–35.7)
MCV RBC AUTO: 96.9 FL (ref 79–97)
MONOCYTES # BLD AUTO: 0.65 10*3/MM3 (ref 0.1–0.9)
MONOCYTES NFR BLD AUTO: 10.4 % (ref 5–12)
NEUTROPHILS NFR BLD AUTO: 4.5 10*3/MM3 (ref 1.7–7)
NEUTROPHILS NFR BLD AUTO: 71.6 % (ref 42.7–76)
NRBC BLD AUTO-RTO: 0 /100 WBC (ref 0–0.2)
PLATELET # BLD AUTO: 224 10*3/MM3 (ref 140–450)
PMV BLD AUTO: 9.9 FL (ref 6–12)
POTASSIUM SERPL-SCNC: 4.5 MMOL/L (ref 3.5–5.2)
PROT SERPL-MCNC: 7.1 G/DL (ref 6–8.5)
QT INTERVAL: 484 MS
QTC INTERVAL: 432 MS
RBC # BLD AUTO: 3.86 10*6/MM3 (ref 4.14–5.8)
RH BLD: POSITIVE
RH BLD: POSITIVE
SODIUM SERPL-SCNC: 140 MMOL/L (ref 136–145)
T&S EXPIRATION DATE: NORMAL
WBC NRBC COR # BLD: 6.28 10*3/MM3 (ref 3.4–10.8)

## 2023-09-29 PROCEDURE — 86850 RBC ANTIBODY SCREEN: CPT | Performed by: ORTHOPAEDIC SURGERY

## 2023-09-29 PROCEDURE — 25010000002 CEFAZOLIN IN DEXTROSE 2-4 GM/100ML-% SOLUTION: Performed by: ORTHOPAEDIC SURGERY

## 2023-09-29 PROCEDURE — C1776 JOINT DEVICE (IMPLANTABLE): HCPCS | Performed by: ORTHOPAEDIC SURGERY

## 2023-09-29 PROCEDURE — 25010000002 ROPIVACAINE PER 1 MG: Performed by: ORTHOPAEDIC SURGERY

## 2023-09-29 PROCEDURE — L3670 SO ACRO/CLAV CAN WEB PRE OTS: HCPCS | Performed by: ORTHOPAEDIC SURGERY

## 2023-09-29 PROCEDURE — G0378 HOSPITAL OBSERVATION PER HR: HCPCS

## 2023-09-29 PROCEDURE — 25010000002 PHENYLEPHRINE 10 MG/ML SOLUTION 1 ML VIAL: Performed by: ANESTHESIOLOGY

## 2023-09-29 PROCEDURE — 25010000002 HYDROMORPHONE PER 4 MG: Performed by: ORTHOPAEDIC SURGERY

## 2023-09-29 PROCEDURE — 25010000002 SUGAMMADEX 200 MG/2ML SOLUTION: Performed by: ANESTHESIOLOGY

## 2023-09-29 PROCEDURE — 25010000002 DEXAMETHASONE SODIUM PHOSPHATE 10 MG/ML SOLUTION: Performed by: NURSE ANESTHETIST, CERTIFIED REGISTERED

## 2023-09-29 PROCEDURE — 73020 X-RAY EXAM OF SHOULDER: CPT

## 2023-09-29 PROCEDURE — 86900 BLOOD TYPING SEROLOGIC ABO: CPT | Performed by: ORTHOPAEDIC SURGERY

## 2023-09-29 PROCEDURE — 85025 COMPLETE CBC W/AUTO DIFF WBC: CPT | Performed by: PHYSICIAN ASSISTANT

## 2023-09-29 PROCEDURE — 86900 BLOOD TYPING SEROLOGIC ABO: CPT

## 2023-09-29 PROCEDURE — 86901 BLOOD TYPING SEROLOGIC RH(D): CPT

## 2023-09-29 PROCEDURE — 88304 TISSUE EXAM BY PATHOLOGIST: CPT | Performed by: ORTHOPAEDIC SURGERY

## 2023-09-29 PROCEDURE — 25010000002 ONDANSETRON PER 1 MG: Performed by: ANESTHESIOLOGY

## 2023-09-29 PROCEDURE — 25010000002 ROPIVACAINE PER 1 MG: Performed by: NURSE ANESTHETIST, CERTIFIED REGISTERED

## 2023-09-29 PROCEDURE — 25010000002 HYDROMORPHONE 1 MG/ML SOLUTION

## 2023-09-29 PROCEDURE — 25010000002 ALBUMIN HUMAN 5% PER 50 ML: Performed by: ANESTHESIOLOGY

## 2023-09-29 PROCEDURE — 25010000002 PROPOFOL 10 MG/ML EMULSION: Performed by: ANESTHESIOLOGY

## 2023-09-29 PROCEDURE — 87070 CULTURE OTHR SPECIMN AEROBIC: CPT | Performed by: ORTHOPAEDIC SURGERY

## 2023-09-29 PROCEDURE — 80053 COMPREHEN METABOLIC PANEL: CPT | Performed by: PHYSICIAN ASSISTANT

## 2023-09-29 PROCEDURE — 25010000002 VANCOMYCIN 1 G RECONSTITUTED SOLUTION: Performed by: ORTHOPAEDIC SURGERY

## 2023-09-29 PROCEDURE — 93005 ELECTROCARDIOGRAM TRACING: CPT | Performed by: ANESTHESIOLOGY

## 2023-09-29 PROCEDURE — 25010000002 CEFAZOLIN IN DEXTROSE 2000 MG/ 100 ML SOLUTION: Performed by: ORTHOPAEDIC SURGERY

## 2023-09-29 PROCEDURE — 86901 BLOOD TYPING SEROLOGIC RH(D): CPT | Performed by: ORTHOPAEDIC SURGERY

## 2023-09-29 PROCEDURE — 25010000002 FENTANYL CITRATE (PF) 50 MCG/ML SOLUTION

## 2023-09-29 PROCEDURE — 87176 TISSUE HOMOGENIZATION CULTR: CPT | Performed by: ORTHOPAEDIC SURGERY

## 2023-09-29 PROCEDURE — 25010000002 BUPIVACAINE (PF) 0.25 % SOLUTION: Performed by: NURSE ANESTHETIST, CERTIFIED REGISTERED

## 2023-09-29 PROCEDURE — P9041 ALBUMIN (HUMAN),5%, 50ML: HCPCS | Performed by: ANESTHESIOLOGY

## 2023-09-29 PROCEDURE — 87205 SMEAR GRAM STAIN: CPT | Performed by: ORTHOPAEDIC SURGERY

## 2023-09-29 RX ORDER — PROPOFOL 10 MG/ML
VIAL (ML) INTRAVENOUS AS NEEDED
Status: DISCONTINUED | OUTPATIENT
Start: 2023-09-29 | End: 2023-09-29 | Stop reason: SURG

## 2023-09-29 RX ORDER — LISINOPRIL 10 MG/1
10 TABLET ORAL DAILY
Status: DISCONTINUED | OUTPATIENT
Start: 2023-09-29 | End: 2023-09-30 | Stop reason: HOSPADM

## 2023-09-29 RX ORDER — OXYCODONE HYDROCHLORIDE 5 MG/1
5 TABLET ORAL EVERY 4 HOURS PRN
Status: DISCONTINUED | OUTPATIENT
Start: 2023-09-29 | End: 2023-09-29

## 2023-09-29 RX ORDER — SODIUM CHLORIDE 0.9 % (FLUSH) 0.9 %
10 SYRINGE (ML) INJECTION EVERY 12 HOURS SCHEDULED
Status: DISCONTINUED | OUTPATIENT
Start: 2023-09-29 | End: 2023-09-29 | Stop reason: HOSPADM

## 2023-09-29 RX ORDER — NALOXONE HCL 0.4 MG/ML
0.1 VIAL (ML) INJECTION
Status: DISCONTINUED | OUTPATIENT
Start: 2023-09-29 | End: 2023-09-30 | Stop reason: HOSPADM

## 2023-09-29 RX ORDER — LABETALOL HYDROCHLORIDE 5 MG/ML
10 INJECTION, SOLUTION INTRAVENOUS EVERY 4 HOURS PRN
Status: DISCONTINUED | OUTPATIENT
Start: 2023-09-29 | End: 2023-09-30 | Stop reason: HOSPADM

## 2023-09-29 RX ORDER — HYDROMORPHONE HYDROCHLORIDE 1 MG/ML
0.5 INJECTION, SOLUTION INTRAMUSCULAR; INTRAVENOUS; SUBCUTANEOUS
Status: DISCONTINUED | OUTPATIENT
Start: 2023-09-29 | End: 2023-09-29 | Stop reason: SDUPTHER

## 2023-09-29 RX ORDER — ALBUMIN, HUMAN INJ 5% 5 %
SOLUTION INTRAVENOUS CONTINUOUS PRN
Status: DISCONTINUED | OUTPATIENT
Start: 2023-09-29 | End: 2023-09-29 | Stop reason: SURG

## 2023-09-29 RX ORDER — FENTANYL CITRATE 50 UG/ML
50 INJECTION, SOLUTION INTRAMUSCULAR; INTRAVENOUS
Status: DISCONTINUED | OUTPATIENT
Start: 2023-09-29 | End: 2023-09-29 | Stop reason: HOSPADM

## 2023-09-29 RX ORDER — CEFAZOLIN SODIUM 2 G/100ML
2000 INJECTION, SOLUTION INTRAVENOUS EVERY 8 HOURS
Status: COMPLETED | OUTPATIENT
Start: 2023-09-29 | End: 2023-09-30

## 2023-09-29 RX ORDER — ROPIVACAINE HYDROCHLORIDE 2 MG/ML
INJECTION, SOLUTION EPIDURAL; INFILTRATION; PERINEURAL CONTINUOUS
Status: DISCONTINUED | OUTPATIENT
Start: 2023-09-29 | End: 2023-09-30 | Stop reason: HOSPADM

## 2023-09-29 RX ORDER — SODIUM CHLORIDE 9 MG/ML
40 INJECTION, SOLUTION INTRAVENOUS AS NEEDED
Status: DISCONTINUED | OUTPATIENT
Start: 2023-09-29 | End: 2023-09-29 | Stop reason: HOSPADM

## 2023-09-29 RX ORDER — BUPIVACAINE HYDROCHLORIDE 2.5 MG/ML
INJECTION, SOLUTION EPIDURAL; INFILTRATION; INTRACAUDAL
Status: COMPLETED | OUTPATIENT
Start: 2023-09-29 | End: 2023-09-29

## 2023-09-29 RX ORDER — GABAPENTIN 400 MG/1
800 CAPSULE ORAL EVERY 8 HOURS SCHEDULED
Status: DISCONTINUED | OUTPATIENT
Start: 2023-09-29 | End: 2023-09-30 | Stop reason: HOSPADM

## 2023-09-29 RX ORDER — ATORVASTATIN CALCIUM 40 MG/1
40 TABLET, FILM COATED ORAL DAILY
Status: DISCONTINUED | OUTPATIENT
Start: 2023-09-30 | End: 2023-09-30 | Stop reason: HOSPADM

## 2023-09-29 RX ORDER — DEXAMETHASONE SODIUM PHOSPHATE 10 MG/ML
INJECTION, SOLUTION INTRAMUSCULAR; INTRAVENOUS
Status: COMPLETED | OUTPATIENT
Start: 2023-09-29 | End: 2023-09-29

## 2023-09-29 RX ORDER — ROCURONIUM BROMIDE 10 MG/ML
INJECTION, SOLUTION INTRAVENOUS AS NEEDED
Status: DISCONTINUED | OUTPATIENT
Start: 2023-09-29 | End: 2023-09-29 | Stop reason: SURG

## 2023-09-29 RX ORDER — TRANEXAMIC ACID 10 MG/ML
1000 INJECTION, SOLUTION INTRAVENOUS ONCE
Status: COMPLETED | OUTPATIENT
Start: 2023-09-29 | End: 2023-09-29

## 2023-09-29 RX ORDER — ACETAMINOPHEN 500 MG
1000 TABLET ORAL ONCE
Status: COMPLETED | OUTPATIENT
Start: 2023-09-29 | End: 2023-09-29

## 2023-09-29 RX ORDER — ONDANSETRON 2 MG/ML
4 INJECTION INTRAMUSCULAR; INTRAVENOUS ONCE AS NEEDED
Status: DISCONTINUED | OUTPATIENT
Start: 2023-09-29 | End: 2023-09-29 | Stop reason: SDUPTHER

## 2023-09-29 RX ORDER — PANTOPRAZOLE SODIUM 40 MG/1
40 TABLET, DELAYED RELEASE ORAL
Status: DISCONTINUED | OUTPATIENT
Start: 2023-09-30 | End: 2023-09-30 | Stop reason: HOSPADM

## 2023-09-29 RX ORDER — SODIUM CHLORIDE, SODIUM LACTATE, POTASSIUM CHLORIDE, CALCIUM CHLORIDE 600; 310; 30; 20 MG/100ML; MG/100ML; MG/100ML; MG/100ML
9 INJECTION, SOLUTION INTRAVENOUS CONTINUOUS
Status: DISCONTINUED | OUTPATIENT
Start: 2023-09-29 | End: 2023-09-29

## 2023-09-29 RX ORDER — FAMOTIDINE 10 MG/ML
20 INJECTION, SOLUTION INTRAVENOUS ONCE
Status: DISCONTINUED | OUTPATIENT
Start: 2023-09-29 | End: 2023-09-29 | Stop reason: HOSPADM

## 2023-09-29 RX ORDER — ACETAMINOPHEN 325 MG/1
650 TABLET ORAL EVERY 4 HOURS PRN
Status: DISCONTINUED | OUTPATIENT
Start: 2023-09-29 | End: 2023-09-30 | Stop reason: HOSPADM

## 2023-09-29 RX ORDER — PREGABALIN 75 MG/1
75 CAPSULE ORAL ONCE
Status: COMPLETED | OUTPATIENT
Start: 2023-09-29 | End: 2023-09-29

## 2023-09-29 RX ORDER — ONDANSETRON 2 MG/ML
4 INJECTION INTRAMUSCULAR; INTRAVENOUS EVERY 6 HOURS PRN
Status: DISCONTINUED | OUTPATIENT
Start: 2023-09-29 | End: 2023-09-30 | Stop reason: HOSPADM

## 2023-09-29 RX ORDER — PHENYLEPHRINE HCL IN 0.9% NACL 1 MG/10 ML
SYRINGE (ML) INTRAVENOUS AS NEEDED
Status: DISCONTINUED | OUTPATIENT
Start: 2023-09-29 | End: 2023-09-29 | Stop reason: SURG

## 2023-09-29 RX ORDER — SODIUM CHLORIDE 0.9 % (FLUSH) 0.9 %
10 SYRINGE (ML) INJECTION AS NEEDED
Status: DISCONTINUED | OUTPATIENT
Start: 2023-09-29 | End: 2023-09-29 | Stop reason: HOSPADM

## 2023-09-29 RX ORDER — SODIUM CHLORIDE 450 MG/100ML
50 INJECTION, SOLUTION INTRAVENOUS CONTINUOUS
Status: DISCONTINUED | OUTPATIENT
Start: 2023-09-29 | End: 2023-09-30 | Stop reason: HOSPADM

## 2023-09-29 RX ORDER — CARVEDILOL 12.5 MG/1
12.5 TABLET ORAL 2 TIMES DAILY
Status: DISCONTINUED | OUTPATIENT
Start: 2023-09-29 | End: 2023-09-30 | Stop reason: HOSPADM

## 2023-09-29 RX ORDER — EPHEDRINE SULFATE 50 MG/ML
INJECTION INTRAVENOUS AS NEEDED
Status: DISCONTINUED | OUTPATIENT
Start: 2023-09-29 | End: 2023-09-29 | Stop reason: SURG

## 2023-09-29 RX ORDER — FENTANYL CITRATE 50 UG/ML
INJECTION, SOLUTION INTRAMUSCULAR; INTRAVENOUS
Status: COMPLETED
Start: 2023-09-29 | End: 2023-09-29

## 2023-09-29 RX ORDER — LIDOCAINE HYDROCHLORIDE 10 MG/ML
INJECTION, SOLUTION EPIDURAL; INFILTRATION; INTRACAUDAL; PERINEURAL AS NEEDED
Status: DISCONTINUED | OUTPATIENT
Start: 2023-09-29 | End: 2023-09-29 | Stop reason: SURG

## 2023-09-29 RX ORDER — ACETAMINOPHEN 650 MG/1
650 SUPPOSITORY RECTAL EVERY 4 HOURS PRN
Status: DISCONTINUED | OUTPATIENT
Start: 2023-09-29 | End: 2023-09-30 | Stop reason: HOSPADM

## 2023-09-29 RX ORDER — BACLOFEN 10 MG/1
20 TABLET ORAL EVERY 8 HOURS SCHEDULED
Status: DISCONTINUED | OUTPATIENT
Start: 2023-09-29 | End: 2023-09-30 | Stop reason: HOSPADM

## 2023-09-29 RX ORDER — HYDROMORPHONE HYDROCHLORIDE 1 MG/ML
0.5 INJECTION, SOLUTION INTRAMUSCULAR; INTRAVENOUS; SUBCUTANEOUS
Status: DISCONTINUED | OUTPATIENT
Start: 2023-09-29 | End: 2023-09-29 | Stop reason: HOSPADM

## 2023-09-29 RX ORDER — ONDANSETRON 2 MG/ML
INJECTION INTRAMUSCULAR; INTRAVENOUS AS NEEDED
Status: DISCONTINUED | OUTPATIENT
Start: 2023-09-29 | End: 2023-09-29 | Stop reason: SURG

## 2023-09-29 RX ORDER — FENTANYL CITRATE 50 UG/ML
50 INJECTION, SOLUTION INTRAMUSCULAR; INTRAVENOUS
Status: DISCONTINUED | OUTPATIENT
Start: 2023-09-29 | End: 2023-09-30 | Stop reason: HOSPADM

## 2023-09-29 RX ORDER — LIDOCAINE HYDROCHLORIDE 10 MG/ML
0.5 INJECTION, SOLUTION EPIDURAL; INFILTRATION; INTRACAUDAL; PERINEURAL ONCE AS NEEDED
Status: COMPLETED | OUTPATIENT
Start: 2023-09-29 | End: 2023-09-29

## 2023-09-29 RX ORDER — ONDANSETRON 2 MG/ML
4 INJECTION INTRAMUSCULAR; INTRAVENOUS EVERY 6 HOURS PRN
Status: DISCONTINUED | OUTPATIENT
Start: 2023-09-29 | End: 2023-09-29 | Stop reason: SDUPTHER

## 2023-09-29 RX ORDER — MIDAZOLAM HYDROCHLORIDE 1 MG/ML
1 INJECTION INTRAMUSCULAR; INTRAVENOUS
Status: DISCONTINUED | OUTPATIENT
Start: 2023-09-29 | End: 2023-09-29 | Stop reason: HOSPADM

## 2023-09-29 RX ORDER — CEFAZOLIN SODIUM 2 G/100ML
2000 INJECTION, SOLUTION INTRAVENOUS ONCE
Status: COMPLETED | OUTPATIENT
Start: 2023-09-29 | End: 2023-09-29

## 2023-09-29 RX ORDER — DIAZEPAM 5 MG/1
5 TABLET ORAL EVERY 12 HOURS PRN
Status: DISCONTINUED | OUTPATIENT
Start: 2023-09-29 | End: 2023-09-30 | Stop reason: HOSPADM

## 2023-09-29 RX ORDER — HYDROMORPHONE HYDROCHLORIDE 1 MG/ML
0.5 INJECTION, SOLUTION INTRAMUSCULAR; INTRAVENOUS; SUBCUTANEOUS
Status: DISCONTINUED | OUTPATIENT
Start: 2023-09-29 | End: 2023-09-30 | Stop reason: HOSPADM

## 2023-09-29 RX ORDER — ONDANSETRON 4 MG/1
4 TABLET, FILM COATED ORAL EVERY 6 HOURS PRN
Status: DISCONTINUED | OUTPATIENT
Start: 2023-09-29 | End: 2023-09-30 | Stop reason: HOSPADM

## 2023-09-29 RX ORDER — OXYCODONE HYDROCHLORIDE 10 MG/1
20 TABLET ORAL EVERY 6 HOURS PRN
Status: DISCONTINUED | OUTPATIENT
Start: 2023-09-29 | End: 2023-09-30 | Stop reason: HOSPADM

## 2023-09-29 RX ORDER — FAMOTIDINE 20 MG/1
20 TABLET, FILM COATED ORAL ONCE
Status: COMPLETED | OUTPATIENT
Start: 2023-09-29 | End: 2023-09-29

## 2023-09-29 RX ORDER — VANCOMYCIN HYDROCHLORIDE 1 G/20ML
INJECTION, POWDER, LYOPHILIZED, FOR SOLUTION INTRAVENOUS AS NEEDED
Status: DISCONTINUED | OUTPATIENT
Start: 2023-09-29 | End: 2023-09-29 | Stop reason: HOSPADM

## 2023-09-29 RX ADMIN — BUPIVACAINE HYDROCHLORIDE 8 ML: 2.5 INJECTION, SOLUTION EPIDURAL; INFILTRATION; INTRACAUDAL; PERINEURAL at 13:25

## 2023-09-29 RX ADMIN — Medication 1000 MG: at 17:16

## 2023-09-29 RX ADMIN — SUGAMMADEX 200 MG: 100 INJECTION, SOLUTION INTRAVENOUS at 16:17

## 2023-09-29 RX ADMIN — BUPIVACAINE HYDROCHLORIDE 25 ML: 2.5 INJECTION, SOLUTION EPIDURAL; INFILTRATION; INTRACAUDAL at 13:35

## 2023-09-29 RX ADMIN — DEXAMETHASONE SODIUM PHOSPHATE 2 MG: 10 INJECTION, SOLUTION INTRAMUSCULAR; INTRAVENOUS at 13:35

## 2023-09-29 RX ADMIN — PROPOFOL 25 MCG/KG/MIN: 10 INJECTION, EMULSION INTRAVENOUS at 15:20

## 2023-09-29 RX ADMIN — Medication 100 MCG: at 15:25

## 2023-09-29 RX ADMIN — LISINOPRIL 10 MG: 10 TABLET ORAL at 20:52

## 2023-09-29 RX ADMIN — PHENYLEPHRINE HYDROCHLORIDE 20 MCG/MIN: 10 INJECTION INTRAVENOUS at 15:18

## 2023-09-29 RX ADMIN — ROCURONIUM BROMIDE 50 MG: 10 SOLUTION INTRAVENOUS at 15:14

## 2023-09-29 RX ADMIN — FENTANYL CITRATE 50 MCG: 50 INJECTION, SOLUTION INTRAMUSCULAR; INTRAVENOUS at 18:25

## 2023-09-29 RX ADMIN — HYDROMORPHONE HYDROCHLORIDE 0.5 MG: 1 INJECTION, SOLUTION INTRAMUSCULAR; INTRAVENOUS; SUBCUTANEOUS at 23:34

## 2023-09-29 RX ADMIN — GABAPENTIN 800 MG: 400 CAPSULE ORAL at 21:02

## 2023-09-29 RX ADMIN — BACLOFEN 20 MG: 10 TABLET ORAL at 21:02

## 2023-09-29 RX ADMIN — Medication 100 MCG: at 15:26

## 2023-09-29 RX ADMIN — LIDOCAINE HYDROCHLORIDE 0.5 ML: 10 INJECTION, SOLUTION EPIDURAL; INFILTRATION; INTRACAUDAL; PERINEURAL at 12:19

## 2023-09-29 RX ADMIN — ACETAMINOPHEN 1000 MG: 500 TABLET ORAL at 12:19

## 2023-09-29 RX ADMIN — TRANEXAMIC ACID 1000 MG: 10 INJECTION, SOLUTION INTRAVENOUS at 15:18

## 2023-09-29 RX ADMIN — CARVEDILOL 12.5 MG: 12.5 TABLET, FILM COATED ORAL at 20:52

## 2023-09-29 RX ADMIN — ONDANSETRON 4 MG: 2 INJECTION INTRAMUSCULAR; INTRAVENOUS at 16:15

## 2023-09-29 RX ADMIN — LIDOCAINE HYDROCHLORIDE 50 MG: 10 INJECTION, SOLUTION EPIDURAL; INFILTRATION; INTRACAUDAL; PERINEURAL at 15:12

## 2023-09-29 RX ADMIN — HYDROMORPHONE HYDROCHLORIDE 0.5 MG: 1 INJECTION, SOLUTION INTRAMUSCULAR; INTRAVENOUS; SUBCUTANEOUS at 18:35

## 2023-09-29 RX ADMIN — ALBUMIN (HUMAN): 12.5 INJECTION, SOLUTION INTRAVENOUS at 15:20

## 2023-09-29 RX ADMIN — DIAZEPAM 5 MG: 5 TABLET ORAL at 23:34

## 2023-09-29 RX ADMIN — TRANEXAMIC ACID 1000 MG: 10 INJECTION, SOLUTION INTRAVENOUS at 16:13

## 2023-09-29 RX ADMIN — PROPOFOL 100 MG: 10 INJECTION, EMULSION INTRAVENOUS at 15:13

## 2023-09-29 RX ADMIN — DEXAMETHASONE SODIUM PHOSPHATE 6 MG: 10 INJECTION, SOLUTION INTRAMUSCULAR; INTRAVENOUS at 15:18

## 2023-09-29 RX ADMIN — SODIUM CHLORIDE, POTASSIUM CHLORIDE, SODIUM LACTATE AND CALCIUM CHLORIDE: 600; 310; 30; 20 INJECTION, SOLUTION INTRAVENOUS at 15:07

## 2023-09-29 RX ADMIN — EPHEDRINE SULFATE 5 MG: 50 INJECTION INTRAVENOUS at 15:25

## 2023-09-29 RX ADMIN — CEFAZOLIN SODIUM 2000 MG: 2 INJECTION, SOLUTION INTRAVENOUS at 22:26

## 2023-09-29 RX ADMIN — SODIUM CHLORIDE 50 ML/HR: 4.5 INJECTION, SOLUTION INTRAVENOUS at 21:05

## 2023-09-29 RX ADMIN — HYDROMORPHONE HYDROCHLORIDE 0.5 MG: 1 INJECTION, SOLUTION INTRAMUSCULAR; INTRAVENOUS; SUBCUTANEOUS at 20:51

## 2023-09-29 RX ADMIN — FAMOTIDINE 20 MG: 20 TABLET ORAL at 12:19

## 2023-09-29 RX ADMIN — OXYCODONE HYDROCHLORIDE 20 MG: 10 TABLET ORAL at 22:26

## 2023-09-29 RX ADMIN — PREGABALIN 75 MG: 75 CAPSULE ORAL at 12:19

## 2023-09-29 RX ADMIN — CEFAZOLIN SODIUM 2000 MG: 2 INJECTION, SOLUTION INTRAVENOUS at 15:08

## 2023-09-29 NOTE — ANESTHESIA POSTPROCEDURE EVALUATION
Patient: Rodolfo Hill    Procedure Summary       Date: 09/29/23 [facility] / [facility]:  [facility]    Anesthesia Start: 1506 Anesthesia Stop: 1715    Procedure: RIGHT REVERSE REVISION (Right: Shoulder) Diagnosis:       Failed arthroplasty      (failed reverse TSA)    Surgeons: Roldan Gipson MD Provider: David Flores MD    Anesthesia Type: general with block ASA Status: 3            Anesthesia Type: general with block    Vitals  Vitals Value Taken Time   /69 09/29/23 1711   Temp 97.5    Pulse 46 09/29/23 1714   Resp 12    SpO2 100 % 09/29/23 1714   Vitals shown include unvalidated device data.        Post Anesthesia Care and Evaluation    Patient location during evaluation: PACU  Patient participation: complete - patient participated  Level of consciousness: sleepy but conscious  Pain score: 0  Pain management: adequate    Airway patency: patent  Anesthetic complications: No anesthetic complications  PONV Status: none  Cardiovascular status: hemodynamically stable and acceptable  Respiratory status: nonlabored ventilation, acceptable and nasal cannula  Hydration status: acceptable

## 2023-09-29 NOTE — ANESTHESIA POSTPROCEDURE EVALUATION
Patient: Rodolfo Hill    Procedure Summary       Date: 09/29/23 Room / [facility]:  [facility]    Anesthesia Start: 1506 Anesthesia Stop: 1715    Procedure: RIGHT REVERSE REVISION (Right: Shoulder) Diagnosis:       Failed arthroplasty      (failed reverse TSA)    Surgeons: Roldan Gipson MD Provider: David Flores MD    Anesthesia Type: general with block ASA Status: 3            Anesthesia Type: general with block    Vitals  Vitals Value Taken Time   /69 09/29/23 1711   Temp 97    Pulse 46 09/29/23 1714   Resp 12    SpO2 100 % 09/29/23 1714   Vitals shown include unvalidated device data.        Post Anesthesia Care and Evaluation    Patient location during evaluation: PACU  Patient participation: complete - patient participated  Level of consciousness: sleepy but conscious  Pain score: 0  Pain management: adequate    Airway patency: patent  Anesthetic complications: No anesthetic complications  PONV Status: none  Cardiovascular status: hemodynamically stable and acceptable  Respiratory status: nonlabored ventilation, acceptable and nasal cannula  Hydration status: acceptable

## 2023-09-29 NOTE — OP NOTE
DATE OF OPERATION: 9/29/2023     PREOPERATIVE DIAGNOSIS: failed right reverse total shoulder arthroplasty, baseplate loosening with severe glenoid erosion      POSTOPERATIVE DIAGNOSES:  same     PROCEDURES PERFORMED:  1.) Explantation of right humeral and glenoid components, baseplate, glenosphere, humeral tray and poly liner  2) placement of right shoulder hemiarthroplasty- with CTA Humeral head     SURGEON: Roldan Gipson MD     ASSISTANTS:  1. Kasie Warner PA-C  ** Please note the physician assistant was medically necessary to assist with positioning retraction, arm positioning, care of soft tissues and closure         ANESTHESIA: General plus block.       ESTIMATED BLOOD LOSS:200mL.       COMPLICATIONS: None.       DISPOSITION: Recovery room in stable condition.      IMPLANTS:  Arthrex humeral head--- 50 mm x 19mm  Arthrex reverse humeral adapter 39mm  Arthrex humeral cup 39 Right offset at 135 degrees     Culture:   tissue specimen x 2        INDICATIONS: This is a 63 year-old male with right shoulder pain and limited function and motion secondary to failed right reverse total shoulder arthroplasty, baseplate loosening with severe glenoid erosion . They have failed conservative treatment and after a discussion of risks, benefits, and alternatives, wished to proceed with shoulder arthroplasty.     DESCRIPTION OF PROCEDURE: On the day of surgery, the patient identified the right shoulder as the correct operative extremity. This was initialed by the surgeon with the patients's acknowledgment. The patient underwent placement of an interscalene block and was taken to the operating room and placed in the supine position. Upon induction of adequate anesthesia, the patient was brought up to the beach chair position and the shoulder and upper extremity were prepped and draped in the usual sterile fashion. Timeout confirmed the correct patient and operative extremity as well as that antibiotics were on board. A standard  deltopectoral approach to the shoulder was carried out. It was carried sharply through the skin and subcutaneous tissue. Medial and lateral flaps were developed over the deltopectoral fascia. The cephalic vein was identified and mobilized laterally with the deltoid. The subdeltoid and subpectoral spaces were mobilized and a blunt retractor was placed deep to this. The clavipectoral fascia was opened on the lateral edge of the conjoined tendon and the retractor was moved deep to this. The inferior capsule was released directly off the humerus to allow greater than 90° of external rotation.      The shoulder was already previously dislocated.  The humeral tray was extracted and was noted to be grossly loose with the set screw showing signs of fatigue.  Culture was take from membrane at proximal aspect of humeral stem   The glenoid was exposed and the baseplate was grossly loose.  It was extracted without difficulty.  The glenoid was eroded past the coracoid base and felt to be unreconstructible.  There was a portion of the inferior most screw which was broken and retained.  It was felt that the glenoid bone would have been further compromised to extract that screw that was buried within the glenoid     We sent 1 tissue culture from the glenoid.      We then cleared soft tissue from the humeral component.  It was stressed and noted to be stable.    We placed a humeral head trial and it was noted to have appropriate soft tissue tension.     We then did a three phase lavage with pulse saline, followed by H202 in dilute betadine, and finally Irrisept.      We then placed a new Reverse Humeral cup, Humeral adapter and a 50/19 CTA Humeral head         The shoulder was then reduced.  This allowed nearly full passive range of motion with no instability. The joint was copiously irrigated with orthopedic irrigation       Vancomycin powder was placed in the wound       The deltopectoral interval was approximated with 0 Vicryl,  the subcutaneous tissue with 2-0 Vicryl, and the skin with nylon. A sterile dressing was placed.  A sling was placed. Anesthesia was reversed and the patient was taken to the recovery room in stable condition. All instrument, needle, and sponge counts were correct.       Roldan Gipson MD, MS   movement

## 2023-09-29 NOTE — H&P
Patient Name: Rodolfo Hill  MRN: [id number]  : 1960  DOS: 2023    Attending: Roldan Gipson MD    Primary Care Provider: Elvis Garcia MD      Chief complaint: Right shoulder pain    Subjective   Patient is a pleasant 63 y.o. male presented for scheduled surgery by Dr. Gipson.  He anticipates a right reverse shoulder revision today.      He had his original shoulder surgery 14 months ago and did well for the first few months.  About 5 months ago his shoulder became dislocated and he has had pain and limited range of motion ever since.      He has past medical history of COPD, current smoker, sleep apnea for which she wears a CPAP, hypertension, hyperlipidemia, and a remote stroke.  He denies further cardiac history.  He denies DVT/PE. ( Has home oxygen and nebulizer machine as well.)wy    When seen in preop, patient is resting comfortably in bed.  He denies chest pain, shortness of breath or nausea.    ( Seen preop, with subsequent notes indicating he later underwent the following procedure   1.) Explantation of right humeral and glenoid components, baseplate, glenosphere, humeral tray and poly liner  2) placement of right shoulder hemiarthroplasty- with CTA Humeral head.  Surgery was done under general anesthesia and a block, was tolerated well, he was admitted for further management.)wy    Allergies:  Allergies   Allergen Reactions    Alupent [Metaproterenol] Arrhythmia     Chest pain, increased heart rate.       Meds:  Medications Prior to Admission   Medication Sig Dispense Refill Last Dose    ASPIR 81 MG EC tablet TAKE 1 TABLET BY MOUTH EVERY DAY   2023 at 1000    atorvastatin (LIPITOR) 40 MG tablet    2023 at 1400    baclofen (LIORESAL) 20 MG tablet    2023 at 0600    carvedilol (COREG) 12.5 MG tablet Take 1 tablet by mouth 2 (Two) Times a Day.   2023 at 0600    clotrimazole (LOTRIMIN) 1 % cream Apply 1 application  topically to the appropriate area as  directed 2 (Two) Times a Day.   Past Week    [employer] D3 125 MCG (5000 UT) capsule capsule    Past Week    diazePAM (VALIUM) 5 MG tablet Take 1 tablet by mouth 2 (Two) Times a Day As Needed for Anxiety.   9/28/2023 at 2100    diclofenac (VOLTAREN) 1 % gel gel Apply 4 g topically 4 (Four) Times a Day As Needed.   Past Week    ferrous sulfate 325 (65 FE) MG tablet    9/28/2023 at 1400    folic acid (FOLVITE) 1 MG tablet    9/28/2023 at 1400    gabapentin (NEURONTIN) 800 MG tablet 1 tab(s) orally 3 times a day for 28 days   9/29/2023 at 0600    lisinopril (PRINIVIL,ZESTRIL) 10 MG tablet Take 1 tablet by mouth Daily.   9/28/2023 at 1400    meloxicam (MOBIC) 15 MG tablet Take 1 tablet by mouth Daily.   9/28/2023 at 1400    methotrexate 2.5 MG tablet Take 8 tablets by mouth 1 (One) Time Per Week.   Past Month    oxyCODONE (ROXICODONE) 20 MG tablet Take 15 mg by mouth Every 6 (Six) Hours As Needed.   9/28/2023 at 2100    pantoprazole (PROTONIX) 40 MG EC tablet    Past Week    vitamin B-12 (CYANOCOBALAMIN) 1000 MCG tablet TAKE 1 TABLET BY MOUTH EVERY DAY FOR 90 DAYS   9/28/2023    mupirocin (BACTROBAN) 2 % nasal ointment Apply to the inside of each nostril with a cotton swab two times daily, morning and evening, for 5 days before surgery. 10 each 0 More than a month    nicotine polacrilex (NICORETTE) 4 MG gum    More than a month          Past Medical History:   Diagnosis Date    Anxiety     COPD (chronic obstructive pulmonary disease)     Hyperlipidemia     Hypertension     Myocardial infarct     Osteoporosis     Rheumatoid arthritis     Sleep apnea with use of continuous positive airway pressure (CPAP)      Past Surgical History:   Procedure Laterality Date    CARPAL TUNNEL RELEASE      KYPHOPLASTY N/A 12/01/2017    Procedure: KYPHOPLASTY T8-9;  Surgeon: Fahad Santana MD;  Location: [facility];  Service:     SHOULDER SURGERY Left 2019    [facility]    SHOULDER SURGERY Right 05/2022    Dr. [facility]     Family History   Problem Relation Age of Onset    Cancer Father      Social History     Tobacco Use    Smoking status: Every Day     Packs/day: 1.00     Years: 40.00     Pack years: 40.00     Types: Cigarettes    Smokeless tobacco: Never    Tobacco comments:     Patient is trying to quit-he uses nicotine chewing gum 01/14/2022   Vaping Use    Vaping Use: Never used   Substance Use Topics    Alcohol use: Not Currently     Comment: rarely    Drug use: No       Review of Systems  Pertinent items are noted in HPI, all other systems reviewed and negative    Vital Signs  /76 (BP Location: Right arm, Patient Position: Lying)   Pulse 52   Temp 97.4 °F (36.3 °C) (Temporal)   Resp 18   Ht 165.1 cm (65\")   Wt 56.7 kg (125 lb)   SpO2 95%   BMI 20.80 kg/m²     Physical Exam:    General Appearance:    Alert, cooperative, in no acute distress   Head:    Normocephalic, without obvious abnormality, atraumatic   Eyes:            Lids and lashes normal, conjunctivae and sclerae normal, no   icterus, no pallor, corneas clear,    Ears:    Ears appear intact with no abnormalities noted   Throat:   No oral lesions, no thrush, oral mucosa moist   Neck:   No adenopathy, supple, trachea midline, no thyromegaly         Lungs:     Clear to auscultation,respirations regular, even and                   unlabored( decreased Breath sounds)wy    Heart:    Regular rhythm and normal rate, normal S1 and S2, no      murmur    Abdomen:     Normal bowel sounds, no masses, no organomegaly, soft        non-tender, non-distended, no guarding, no rebound                 tenderness   Genitalia:    Deferred   Extremities: Right upper extremity in a sling.  Distal pulses, cap refill, movements of fingers, wrist, intact.     Pulses:   Pulses palpable and equal bilaterally   Skin:   No bleeding, bruising or rash   Neurologic:   Cranial nerves 2 - 12 grossly intact      I reviewed the patient's new clinical results.       Results from " last 7 days   Lab Units 09/29/23  1150   WBC 10*3/mm3 6.28   HEMOGLOBIN g/dL 11.9*   HEMATOCRIT % 37.4*   PLATELETS 10*3/mm3 224     Results from last 7 days   Lab Units 09/29/23  1150   SODIUM mmol/L 140   POTASSIUM mmol/L 4.5   CHLORIDE mmol/L 100   CO2 mmol/L 32.0*   BUN mg/dL 15   CREATININE mg/dL 0.76   CALCIUM mg/dL 9.4   BILIRUBIN mg/dL 0.6   ALK PHOS U/L 85   ALT (SGPT) U/L 13   AST (SGOT) U/L 18   GLUCOSE mg/dL 87     No results found for: HGBA1C        Assessment and Plan:   S/p 1.) Explantation of right humeral and glenoid components, baseplate, glenosphere, humeral tray and poly liner  2) placement of right shoulder hemiarthroplasty- with CTA Humeral head    Failed arthroplasty    Anxiety and depression    Current every day smoker    COPD (chronic obstructive pulmonary disease)    Sleep apnea    Hypertension    Hyperlipidemia      Plan    1. PT/OT. NWB, RUE, ROM hand, wrist, elbow.  2. Pain control-prns, interscalene nerve block cath with ropivacaine infusion.   3. IS-encourage  4. DVT proph- Mech/ mobilize.  5. Bowel regimen  6. Resume home medications as appropriate  7. Monitor post-op labs  8. DC planning home    HTN, Hyperlipidemia  - Continue home Coreg, lisinopril, statin  - Monitor BP   - Holding parameters for BP meds  - Labetalol PRN for SBP>170    COPD, sleep apnea, current smoker  -Monitor O2 sats  -CPAP as needed  -Nicorette gum as needed    Anxiety and depression  -Continue home regimen    Chronic pain medication use( resuming home regimen of Oxy 20 mg 4 times daily as neede)cesar Felipe disclaimer:  Part of this encounter note is an electronic transcription/translation of spoken language to printed text. The electronic translation of spoken language may permit erroneous, or at times, nonsensical words or phrases to be inadvertently transcribed; Although I have reviewed the note for such errors, some may still exist.    Electronically signed by BECKI Garnett, 09/29/23, 4:12 PM  SENDY.    I have personally performed the evaluation on this patient. My history is consistent  with HPI obtained. My exam findings are listed above. I have personally reviewed and formulated the above treatment plan with Scott

## 2023-09-29 NOTE — ANESTHESIA PROCEDURE NOTES
Right Interscalene      Patient reassessed immediately prior to procedure    Patient location during procedure: pre-op  Reason for block: at surgeon's request and post-op pain management  Performed by  CRNA/CAA: Guilherme Echeverria CRNA  Preanesthetic Checklist  Completed: patient identified, IV checked, site marked, risks and benefits discussed, surgical consent, monitors and equipment checked, pre-op evaluation and timeout performed  Prep:  Sterile barriers:cap, gloves, mask and washed/disinfected hands  Prep: ChloraPrep  Patient monitoring: blood pressure monitoring, continuous pulse oximetry and EKG  Procedure    Sedation: yes  Performed under: local infiltration  Guidance:ultrasound guided    ULTRASOUND INTERPRETATION.  Using ultrasound guidance a 20 G gauge needle was placed in close proximity to the nerve, at which point, under ultrasound guidance anesthetic was injected in the area of the nerve and spread of the anesthesia was seen on ultrasound in close proximity thereto.  There were no abnormalities seen on ultrasound; a digital image was taken; and the patient tolerated the procedure with no complications. Images:still images obtained, printed/placed on chart    Laterality:right  Block Type:interscalene  Injection Technique:catheter  Needle Type:Tuohy and echogenic  Needle Gauge:18 G  Resistance on Injection: none  Catheter Size:20 G (20g)  Cath Depth at skin: 8 cm    Medications Used: bupivacaine PF (MARCAINE) 0.25 % injection - Injection   8 mL - 9/29/2023 1:25:00 PM      Post Assessment  Injection Assessment: negative aspiration for heme, no paresthesia on injection and incremental injection  Patient Tolerance:comfortable throughout block  Complications:no  Additional Notes  CATHETER  A high-frequency linear transducer, with sterile cover, was placed in the supraclavicular fossa to identify the subclavian artery and trunks and divisions of the brachial plexus. The transducer was then moved in a cephalad  "orientation with a slight rotation to continue visualization of the brachial plexus from the trunks and divisions, on to the C5-C7 roots. The insertion site was prepped and draped in sterile fashion. Skin and cutaneous tissue was infiltrated with 2-5 ml of 1% Lidocaine. Using ultrasound-guidance, an 18-gauge Contiplex Ultra 360 Touhy needle was advanced in plane from lateral to medial. Preservative-free normal saline was utilized for hydro-dissection of tissue, advancement of Touhy, and to confirm final needle placement at the fascial plane between the middle scalene muscle and sheath of the brachial plexus (C5-C7). A 20-gauge Contiplex Echo catheter was placed through the needle and advance out the tip of the Touhy 3-5 cm with the \"Rosales Flip\". The Touhy needle was then removed, and final catheter position verified lateral to the brachial plexus with local anesthetic (LA) and ultrasound visualization. The catheter was secured in the usual fashion with skin glue, benzoin, steri-strips, CHG tegaderm and label noting \"Nerve Block Catheter\". Jerk tape applied at yellow connector and catheter connection. All LA was injected in increments of 3-5 ml after catheter secured. Aspiration every 5 ml to prevent intravascular injection. Injection was completed with negative aspiration of blood and negative intravascular injection. Injection pressures were normal with minimal resistance.           "

## 2023-09-29 NOTE — H&P
Pre-Op H&P  Rodolfo Hill  [id number]  1960      Chief complaint: Right shoulder pain      Subjective:  Patient is a 63 y.o.male presents for scheduled surgery by Dr. Gipson. He anticipates a RIGHT REVERSE REVISION  today. He reports he had original shoulder replacement about 14 months ago. He states about 5 months ago the shoulder dislocated and he has had pain and limited ROM since.       Review of Systems:  Constitutional-- No fever, chills or sweats. + fatigue.  CV-- No chest pain, palpitation or syncope. +HTN, HLD  Resp-- No cough, hemoptysis. +SOB, COPD, BOOM on cpap   Skin--No rashes or lesions      Allergies:   Allergies   Allergen Reactions    Alupent [Metaproterenol] Arrhythmia     Chest pain, increased heart rate.         Home Meds:  Medications Prior to Admission   Medication Sig Dispense Refill Last Dose    ASPIR 81 MG EC tablet TAKE 1 TABLET BY MOUTH EVERY DAY   9/28/2023 at 1000    atorvastatin (LIPITOR) 40 MG tablet    9/28/2023 at 1400    baclofen (LIORESAL) 20 MG tablet    9/29/2023 at 0600    carvedilol (COREG) 12.5 MG tablet Take 1 tablet by mouth 2 (Two) Times a Day.   9/29/2023 at 0600    clotrimazole (LOTRIMIN) 1 % cream Apply 1 application  topically to the appropriate area as directed 2 (Two) Times a Day.   Past Week    CVS D3 125 MCG (5000 UT) capsule capsule    Past Week    diazePAM (VALIUM) 5 MG tablet Take 1 tablet by mouth 2 (Two) Times a Day As Needed for Anxiety.   9/28/2023 at 2100    diclofenac (VOLTAREN) 1 % gel gel Apply 4 g topically 4 (Four) Times a Day As Needed.   Past Week    ferrous sulfate 325 (65 FE) MG tablet    9/28/2023 at 1400    folic acid (FOLVITE) 1 MG tablet    9/28/2023 at 1400    gabapentin (NEURONTIN) 800 MG tablet 1 tab(s) orally 3 times a day for 28 days   9/29/2023 at 0600    lisinopril (PRINIVIL,ZESTRIL) 10 MG tablet Take 1 tablet by mouth Daily.   9/28/2023 at 1400    meloxicam (MOBIC) 15 MG tablet Take 1 tablet by mouth Daily.   9/28/2023  at 1400    methotrexate 2.5 MG tablet Take 8 tablets by mouth 1 (One) Time Per Week.   Past Month    oxyCODONE (ROXICODONE) 20 MG tablet Take 15 mg by mouth Every 6 (Six) Hours As Needed.   9/28/2023 at 2100    pantoprazole (PROTONIX) 40 MG EC tablet    Past Week    vitamin B-12 (CYANOCOBALAMIN) 1000 MCG tablet TAKE 1 TABLET BY MOUTH EVERY DAY FOR 90 DAYS   9/28/2023    chlorhexidine (HIBICLENS) 4 % external liquid Shower each day with solution for 5 days beginning 5 days before surgery. 120 mL 0     mupirocin (BACTROBAN) 2 % nasal ointment Apply to the inside of each nostril with a cotton swab two times daily, morning and evening, for 5 days before surgery. 10 each 0 More than a month    nicotine polacrilex (NICORETTE) 4 MG gum    More than a month         PMH:   Past Medical History:   Diagnosis Date    Anxiety     COPD (chronic obstructive pulmonary disease)     Hyperlipidemia     Hypertension     Myocardial infarct     Osteoporosis     Rheumatoid arthritis     Sleep apnea with use of continuous positive airway pressure (CPAP)      PSH:    Past Surgical History:   Procedure Laterality Date    CARPAL TUNNEL RELEASE      KYPHOPLASTY N/A 12/01/2017    Procedure: KYPHOPLASTY T8-9;  Surgeon: Fahad Santana MD;  Location: [facility];  Service:     SHOULDER SURGERY Left 2019    [facility]    SHOULDER SURGERY Right 05/2022    [facility]       Immunization History:  Influenza: No  Pneumococcal: UTD  Tetanus: UTD  Covid : x4    Social History:   Tobacco:   Social History     Tobacco Use   Smoking Status Every Day    Packs/day: 1.00    Years: 40.00    Pack years: 40.00    Types: Cigarettes   Smokeless Tobacco Never   Tobacco Comments    Patient is trying to quit-he uses nicotine chewing gum 01/14/2022      Alcohol:     Social History     Substance and Sexual Activity   Alcohol Use Not Currently    Comment: rarely         Physical Exam:/76 (BP Location: Right arm, Patient  "Position: Lying)   Pulse 52   Temp 97.4 °F (36.3 °C) (Temporal)   Resp 18   Ht 165.1 cm (65\")   Wt 56.7 kg (125 lb)   SpO2 95%   BMI 20.80 kg/m²       General Appearance:    Alert, cooperative, no distress, appears stated age   Head:    Normocephalic, without obvious abnormality, atraumatic   Lungs:     Clear to auscultation bilaterally, respirations unlabored    Heart:   Regular rate and rhythm, S1 and S2 normal    Abdomen:    Soft without tenderness   Extremities:   Extremities normal, atraumatic, no cyanosis or edema   Skin:   Skin color, texture, turgor normal, no rashes or lesions   Neurologic:   Grossly intact     Results Review:     LABS:  Lab Results   Component Value Date    WBC 8.39 12/05/2017    HGB 12.6 (L) 12/05/2017    HCT 39.0 12/05/2017    MCV 93.5 12/05/2017     12/05/2017    NEUTROABS 6.02 12/05/2017    GLUCOSE 102 (H) 12/05/2017    BUN 17 12/05/2017    CREATININE 0.80 03/02/2022    EGFRIFNONA 116 12/05/2017     12/05/2017    K 4.2 12/05/2017     12/05/2017    CO2 34.0 (H) 12/05/2017    CALCIUM 9.4 12/05/2017    ALBUMIN 4.20 12/05/2017    AST 12 12/05/2017    ALT 9 12/05/2017    BILITOT 0.3 12/05/2017       RADIOLOGY:  Imaging Results (Last 72 Hours)       ** No results found for the last 72 hours. **            I reviewed the patient's new clinical results.    Cancer Staging (if applicable)  Cancer Patient: __ yes __no __unknown; If yes, clinical stage T:__ N:__M:__, stage group or __N/A      Impression: Right shoulder pain      Plan: RIGHT REVERSE REVISION       BECKI Salazar   9/29/2023   12:21 EDT  "

## 2023-09-29 NOTE — ANESTHESIA PROCEDURE NOTES
Airway  Urgency: elective    Date/Time: 9/29/2023 3:16 PM  Airway not difficult    General Information and Staff    Patient location during procedure: OR  SRNA: Adry Rivera SRNA  Indications and Patient Condition  Indications for airway management: airway protection    Preoxygenated: yes  MILS not maintained throughout  Mask difficulty assessment: 1 - vent by mask    Final Airway Details  Final airway type: endotracheal airway      Successful airway: ETT  Cuffed: yes   Successful intubation technique: direct laryngoscopy  Endotracheal tube insertion site: oral  Blade: Bautista  Blade size: 2  ETT size (mm): 7.0  Cormack-Lehane Classification: grade I - full view of glottis  Placement verified by: chest auscultation and capnometry   Cuff volume (mL): 7  Measured from: lips  ETT/EBT  to lips (cm): 21  Number of attempts at approach: 1  Assessment: lips, teeth, and gum same as pre-op and atraumatic intubation    Additional Comments  Negative epigastric sounds, Breath sound equal bilaterally with symmetric chest rise and fall

## 2023-09-29 NOTE — ANESTHESIA PREPROCEDURE EVALUATION
Anesthesia Evaluation     Patient summary reviewed and Nursing notes reviewed   no history of anesthetic complications:   NPO Solid Status: > 8 hours  NPO Liquid Status: > 2 hours           Airway   Mallampati: II  TM distance: >3 FB  Neck ROM: full  No difficulty expected  Dental    (+) edentulous, lower dentures and upper dentures    Pulmonary - normal exam   (+) a smoker Current, COPD,sleep apnea on CPAP  Cardiovascular - normal exam    ECG reviewed    (+) hypertension, hyperlipidemia  (-) murmur      Neuro/Psych  (+) psychiatric history Anxiety and Depression  (-) seizures, CVA  GI/Hepatic/Renal/Endo      Musculoskeletal     (+) back pain, neck pain  Abdominal    Substance History      OB/GYN          Other   arthritis (RA),                     Anesthesia Plan    ASA 3     general with block     intravenous induction     Anesthetic plan, risks, benefits, and alternatives have been provided, discussed and informed consent has been obtained with: patient.    Plan discussed with CRNA.      CODE STATUS:

## 2023-09-29 NOTE — ANESTHESIA PROCEDURE NOTES
Peripheral Block      Patient reassessed immediately prior to procedure    Patient location during procedure: OR  Reason for block: at surgeon's request and post-op pain management  Performed by  CRNA/CAA: Guilherme Echeevrria CRNA  Preanesthetic Checklist  Completed: patient identified, IV checked, site marked, risks and benefits discussed, surgical consent, monitors and equipment checked, pre-op evaluation and timeout performed  Prep:  Pt Position: supine  Sterile barriers:cap, gloves, mask and washed/disinfected hands  Prep: ChloraPrep  Patient monitoring: blood pressure monitoring, continuous pulse oximetry and EKG  Procedure  Performed under: general  Guidance:ultrasound guided and landmark technique  Images:still images obtained, printed/placed on chart    Laterality:right  Block Type:PECS I and PECS II  Injection Technique:single-shot  Needle Type:short-bevel  Needle Gauge:20 G  Resistance on Injection: none    Medications Used: dexamethasone sodium phosphate injection - Injection   2 mg - 9/29/2023 1:35:00 PM  bupivacaine PF (MARCAINE) 0.25 % injection - Injection   25 mL - 9/29/2023 1:35:00 PM      Medications  Preservative Free Saline:10ml  Comment:Block Injection:  Total volume of LA divided between Right and Left sided blocks         Post Assessment  Injection Assessment: negative aspiration for heme, incremental injection and no paresthesia on injection  Patient Tolerance:comfortable throughout block  Complications:no  Additional Notes  Interpectoral-Pectoserratus Plane   A high-frequency linear transducer, with sterile cover, was placed medial to the coracoid process in the paramedian sagittal plane. The transducer was moved caudally to the 4th rib and rotated slightly to allow an in-plane needle trajectory from medial to lateral. Pectoralis Major Muscle (PMM), Pectoralis Minor Muscle (PmM), Thoracoacromial Artery, Ribs, and Pleura were identified under ultrasound. The insertion site was prepped in sterile  "fashion and then localized with 2-5 ml of 1% Lidocaine. Using ultrasound-guidance, a 20-gauge B-Ventura 4\" Ultraplex 360 non-stimulating echogenic needle was advanced in plane until the tip of the needle was in the fascial plane between the PMM and PmM, lateral to the Thoracoacromial Artery. 1-3ml of preservative free normal saline was used to hydro-dissect the fascial planes. After the fascial plane was verified, 10ml local anesthetic (LA) was injected for Interpectoral fascial plane block. The needle was continued along the same path to the level of the 4th rib below PmM.  Initially preservative free normal saline was used to confirm needle position and then 20 ml of LA was injected for Pectoserratus fascial plane block. Aspiration every 5 ml to prevent intravascular injection. Injection was completed with negative aspiration of blood and negative intravascular injection. Injection pressures were normal with minimal resistance.             "

## 2023-09-30 VITALS
SYSTOLIC BLOOD PRESSURE: 124 MMHG | DIASTOLIC BLOOD PRESSURE: 67 MMHG | HEART RATE: 59 BPM | HEIGHT: 65 IN | WEIGHT: 125 LBS | BODY MASS INDEX: 20.83 KG/M2 | RESPIRATION RATE: 16 BRPM | OXYGEN SATURATION: 98 % | TEMPERATURE: 98.6 F

## 2023-09-30 PROBLEM — Z99.81 ON HOME O2: Status: ACTIVE | Noted: 2023-09-30

## 2023-09-30 LAB
ANION GAP SERPL CALCULATED.3IONS-SCNC: 7 MMOL/L (ref 5–15)
BASOPHILS # BLD AUTO: 0.02 10*3/MM3 (ref 0–0.2)
BASOPHILS NFR BLD AUTO: 0.2 % (ref 0–1.5)
BUN SERPL-MCNC: 15 MG/DL (ref 8–23)
BUN/CREAT SERPL: 20.8 (ref 7–25)
CALCIUM SPEC-SCNC: 8.8 MG/DL (ref 8.6–10.5)
CHLORIDE SERPL-SCNC: 100 MMOL/L (ref 98–107)
CO2 SERPL-SCNC: 30 MMOL/L (ref 22–29)
CREAT SERPL-MCNC: 0.72 MG/DL (ref 0.76–1.27)
DEPRECATED RDW RBC AUTO: 43.9 FL (ref 37–54)
EGFRCR SERPLBLD CKD-EPI 2021: 102 ML/MIN/1.73
EOSINOPHIL # BLD AUTO: 0 10*3/MM3 (ref 0–0.4)
EOSINOPHIL NFR BLD AUTO: 0 % (ref 0.3–6.2)
ERYTHROCYTE [DISTWIDTH] IN BLOOD BY AUTOMATED COUNT: 12.9 % (ref 12.3–15.4)
GLUCOSE SERPL-MCNC: 120 MG/DL (ref 65–99)
HCT VFR BLD AUTO: 34.2 % (ref 37.5–51)
HGB BLD-MCNC: 11.2 G/DL (ref 13–17.7)
IMM GRANULOCYTES # BLD AUTO: 0.04 10*3/MM3 (ref 0–0.05)
IMM GRANULOCYTES NFR BLD AUTO: 0.4 % (ref 0–0.5)
LYMPHOCYTES # BLD AUTO: 0.55 10*3/MM3 (ref 0.7–3.1)
LYMPHOCYTES NFR BLD AUTO: 5.8 % (ref 19.6–45.3)
MCH RBC QN AUTO: 30.6 PG (ref 26.6–33)
MCHC RBC AUTO-ENTMCNC: 32.7 G/DL (ref 31.5–35.7)
MCV RBC AUTO: 93.4 FL (ref 79–97)
MONOCYTES # BLD AUTO: 0.79 10*3/MM3 (ref 0.1–0.9)
MONOCYTES NFR BLD AUTO: 8.4 % (ref 5–12)
NEUTROPHILS NFR BLD AUTO: 8.01 10*3/MM3 (ref 1.7–7)
NEUTROPHILS NFR BLD AUTO: 85.2 % (ref 42.7–76)
NRBC BLD AUTO-RTO: 0 /100 WBC (ref 0–0.2)
PLATELET # BLD AUTO: 220 10*3/MM3 (ref 140–450)
PMV BLD AUTO: 10 FL (ref 6–12)
POTASSIUM SERPL-SCNC: 4.4 MMOL/L (ref 3.5–5.2)
RBC # BLD AUTO: 3.66 10*6/MM3 (ref 4.14–5.8)
SODIUM SERPL-SCNC: 137 MMOL/L (ref 136–145)
WBC NRBC COR # BLD: 9.41 10*3/MM3 (ref 3.4–10.8)

## 2023-09-30 PROCEDURE — 97530 THERAPEUTIC ACTIVITIES: CPT

## 2023-09-30 PROCEDURE — 25010000002 HYDROMORPHONE PER 4 MG: Performed by: ORTHOPAEDIC SURGERY

## 2023-09-30 PROCEDURE — 25010000002 CEFAZOLIN IN DEXTROSE 2-4 GM/100ML-% SOLUTION: Performed by: ORTHOPAEDIC SURGERY

## 2023-09-30 PROCEDURE — 97166 OT EVAL MOD COMPLEX 45 MIN: CPT

## 2023-09-30 PROCEDURE — 97162 PT EVAL MOD COMPLEX 30 MIN: CPT

## 2023-09-30 PROCEDURE — 80048 BASIC METABOLIC PNL TOTAL CA: CPT | Performed by: ORTHOPAEDIC SURGERY

## 2023-09-30 PROCEDURE — 85025 COMPLETE CBC W/AUTO DIFF WBC: CPT | Performed by: ORTHOPAEDIC SURGERY

## 2023-09-30 RX ORDER — DOCUSATE SODIUM 100 MG/1
100 CAPSULE, LIQUID FILLED ORAL 2 TIMES DAILY
Qty: 60 CAPSULE | Refills: 0 | Status: SHIPPED | OUTPATIENT
Start: 2023-09-30

## 2023-09-30 RX ORDER — ROPIVACAINE HYDROCHLORIDE 2 MG/ML
1 INJECTION, SOLUTION EPIDURAL; INFILTRATION; PERINEURAL CONTINUOUS
Start: 2023-09-30

## 2023-09-30 RX ADMIN — SODIUM CHLORIDE 500 ML: 9 INJECTION, SOLUTION INTRAVENOUS at 11:25

## 2023-09-30 RX ADMIN — GABAPENTIN 800 MG: 400 CAPSULE ORAL at 05:21

## 2023-09-30 RX ADMIN — OXYCODONE HYDROCHLORIDE 20 MG: 10 TABLET ORAL at 10:38

## 2023-09-30 RX ADMIN — OXYCODONE HYDROCHLORIDE 20 MG: 10 TABLET ORAL at 04:08

## 2023-09-30 RX ADMIN — LISINOPRIL 10 MG: 10 TABLET ORAL at 10:38

## 2023-09-30 RX ADMIN — CARVEDILOL 12.5 MG: 12.5 TABLET, FILM COATED ORAL at 10:38

## 2023-09-30 RX ADMIN — BACLOFEN 20 MG: 10 TABLET ORAL at 15:42

## 2023-09-30 RX ADMIN — ATORVASTATIN CALCIUM 40 MG: 40 TABLET, FILM COATED ORAL at 10:38

## 2023-09-30 RX ADMIN — PANTOPRAZOLE SODIUM 40 MG: 40 TABLET, DELAYED RELEASE ORAL at 05:21

## 2023-09-30 RX ADMIN — OXYCODONE HYDROCHLORIDE 20 MG: 10 TABLET ORAL at 15:42

## 2023-09-30 RX ADMIN — BACLOFEN 20 MG: 10 TABLET ORAL at 05:21

## 2023-09-30 RX ADMIN — HYDROMORPHONE HYDROCHLORIDE 0.5 MG: 1 INJECTION, SOLUTION INTRAMUSCULAR; INTRAVENOUS; SUBCUTANEOUS at 01:41

## 2023-09-30 RX ADMIN — HYDROMORPHONE HYDROCHLORIDE 0.5 MG: 1 INJECTION, SOLUTION INTRAMUSCULAR; INTRAVENOUS; SUBCUTANEOUS at 05:22

## 2023-09-30 RX ADMIN — CEFAZOLIN SODIUM 2000 MG: 2 INJECTION, SOLUTION INTRAVENOUS at 06:21

## 2023-09-30 RX ADMIN — GABAPENTIN 800 MG: 400 CAPSULE ORAL at 15:42

## 2023-09-30 NOTE — THERAPY EVALUATION
Patient Name: Rodolfo Hill  : 1960    MRN: [id number]                              Today's Date: 2023       Admit Date: 2023    Visit Dx:     ICD-10-CM ICD-9-CM   1. Cervical radiculopathy  M54.12 723.4   2. Shoulder pain  M25.519 719.41     Patient Active Problem List   Diagnosis    Pathologic compression fracture of vertebra    Brachial plexus injury    Cervical spondylosis without myelopathy    DDD (degenerative disc disease), cervical    Cervical spinal stenosis    Rheumatoid arthritis involving multiple sites    Gait disturbance    Anxiety and depression    Current every day smoker    Encounter for smoking cessation counseling    Occipital neuralgia    Physical deconditioning    Connective tissue stenosis of neural canal of cervical region    Cervical radiculopathy    Failed arthroplasty    COPD (chronic obstructive pulmonary disease)    Sleep apnea    Hypertension    Hyperlipidemia    Chronic back pain     Past Medical History:   Diagnosis Date    Anxiety     COPD (chronic obstructive pulmonary disease)     Hyperlipidemia     Hypertension     Myocardial infarct     Osteoporosis     Rheumatoid arthritis     Sleep apnea with use of continuous positive airway pressure (CPAP)      Past Surgical History:   Procedure Laterality Date    CARPAL TUNNEL RELEASE      KYPHOPLASTY N/A 2017    Procedure: KYPHOPLASTY T8-9;  Surgeon: Fahad Santana MD;  Location: [facility];  Service:     SHOULDER SURGERY Left     [facility]    SHOULDER SURGERY Right 2022    [facility]      General Information       Row Name 23 1113          Physical Therapy Time and Intention    Document Type evaluation  -AE     Mode of Treatment physical therapy  -AE       Row Name 23 1113          General Information    Patient Profile Reviewed yes  -AE     Prior Level of Function independent:;all household mobility;community mobility;gait;transfer;bed  "mobility;ADL's;dressing;bathing  -AE     Existing Precautions/Restrictions fall;oxygen therapy device and L/min;other (see comments)  R hemiarthroplasty; R IS nerve cath; R sling with abduction pillow; wound vac  -AE     Barriers to Rehab medically complex  -AE       Row Name 09/30/23 1113          Living Environment    People in Home alone  -AE       Row Name 09/30/23 1113          Home Main Entrance    Number of Stairs, Main Entrance one  -AE     Stair Railings, Main Entrance none  -AE       Row Name 09/30/23 1113          Stairs Within Home, Primary    Number of Stairs, Within Home, Primary none  -AE     Stair Railings, Within Home, Primary none  -AE       Row Name 09/30/23 1113          Cognition    Orientation Status (Cognition) oriented to;person;time;verbal cues/prompts needed for orientation;place  Pt stated \"hospital\" and \"[place]\" but unable to specify specific name; did not know month  -AE       Row Name 09/30/23 1113          Safety Issues, Functional Mobility    Safety Issues Affecting Function (Mobility) awareness of need for assistance;insight into deficits/self-awareness;safety precaution awareness;impulsivity;safety precautions follow-through/compliance;sequencing abilities;judgment;problem-solving  -AE     Impairments Affecting Function (Mobility) balance;coordination;endurance/activity tolerance;shortness of breath;strength;postural/trunk control;sensation/sensory awareness;range of motion (ROM)  -AE               User Key  (r) = Recorded By, (t) = Taken By, (c) = Cosigned By      Initials Name Provider Type    AE Erik Lamas, PT Physical Therapist                   Mobility       Row Name 09/30/23 1116          Bed Mobility    Bed Mobility supine-sit  -AE     Supine-Sit [place] (Bed Mobility) contact guard;1 person assist;verbal cues  -AE     Assistive Device (Bed Mobility) head of bed elevated;bed rails  -AE     Comment, (Bed Mobility) VCs for hand placement and sequencing. Pt " required cues to reduce impulsivity and become more aware of line management to reduce risk of dislodgement of nerve cath. Pt demo minimal safety awareness, improved slightly with cues.  -AE       Row Name 09/30/23 1116          Transfers    Comment, (Transfers) VCs for hand placement and sequencing. No overt LOB noted with STS, cues required for line management. Pt given SPC to improve stability while OOB.  -AE       Row Name 09/30/23 1116          Bed-Chair Transfer    Bed-Chair [place] (Transfers) contact guard;1 person assist;verbal cues  -AE       Row Name 09/30/23 1116          Sit-Stand Transfer    Sit-Stand [place] (Transfers) contact guard;1 person assist;verbal cues  -AE     Assistive Device (Sit-Stand Transfers) cane, straight  -AE       Row Name 09/30/23 1116          Gait/Stairs (Locomotion)    [place] Level (Gait) contact guard;minimum assist (75% patient effort);1 person assist;verbal cues  -AE     Assistive Device (Gait) cane, straight  -AE     Distance in Feet (Gait) 325  -AE     Deviations/Abnormal Patterns (Gait) bilateral deviations;base of support, narrow;[place] decreased;gait speed decreased;scissoring;stride length decreased  -AE     Bilateral Gait Deviations forward flexed posture;heel strike decreased  -AE     Comment, (Gait/Stairs) Pt demo step through gait pattern with quick pace and decreased safety awareness. Pt required increased cues to improve safety and decreased risk of falling. With increased time and cues, pt able to ambulate with SPC in LUE without LOB. However, if patient is distracted, LOB occurs. Multiple LOB noted during session requiring min A to correct.  -AE       Row Name 09/30/23 1116          Mobility    Extremity Weight-bearing Status right upper extremity  -AE     Right Upper Extremity (Weight-bearing Status) non weight-bearing (NWB)   -AE               User Key  (r) = Recorded By, (t) = Taken By, (c) = Cosigned By      Initials Name Provider Type     AE Erik Lamas, PT Physical Therapist                   Obj/Interventions       Row Name 09/30/23 1123          Range of Motion Comprehensive    General Range of Motion bilateral lower extremity ROM WFL  -AE       Row Name 09/30/23 1123          Strength Comprehensive (MMT)    General Manual Muscle Testing (MMT) Assessment lower extremity strength deficits identified  -AE     Comment, General Manual Muscle Testing (MMT) Assessment BLE grossly 4/5  -AE       Row Name 09/30/23 1123          Balance    Balance Assessment sitting static balance;sitting dynamic balance;sit to stand dynamic balance;standing static balance;standing dynamic balance  -AE     Static Sitting Balance contact guard  -AE     Dynamic Sitting Balance contact guard  -AE     Position, Sitting Balance unsupported;sitting edge of bed  -AE     Sit to Stand Dynamic Balance contact guard;1-person assist;verbal cues  -AE     Static Standing Balance contact guard  -AE     Dynamic Standing Balance minimal assist;1-person assist;verbal cues  -AE     Position/Device Used, Standing Balance unsupported  -AE       Row Name 09/30/23 1123          Sensory Assessment (Somatosensory)    Sensory Assessment (Somatosensory) LE sensation intact  -AE               User Key  (r) = Recorded By, (t) = Taken By, (c) = Cosigned By      Initials Name Provider Type    AE Erik Lamas, PT Physical Therapist                   Goals/Plan       Row Name 09/30/23 1130          Bed Mobility Goal 1 (PT)    Activity/Assistive Device (Bed Mobility Goal 1, PT) sit to supine/supine to sit  -AE     [place] Level/Cues Needed (Bed Mobility Goal 1, PT) modified independence  -AE     Time Frame (Bed Mobility Goal 1, PT) short term goal (STG);5 days  -AE     Progress/Outcomes (Bed Mobility Goal 1, PT) new goal  -AE       Row Name 09/30/23 1130          Transfer Goal 1 (PT)    Activity/Assistive Device (Transfer Goal 1, PT) sit-to-stand/stand-to-sit;bed-to-chair/chair-to-bed  -AE      [place] Level/Cues Needed (Transfer Goal 1, PT) modified independence  -AE     Time Frame (Transfer Goal 1, PT) short term goal (STG);5 days  -AE     Progress/Outcome (Transfer Goal 1, PT) new goal  -AE       Row Name 09/30/23 1130          Gait Training Goal 1 (PT)    Activity/Assistive Device (Gait Training Goal 1, PT) gait (walking locomotion);assistive device use  -AE     [place] Level (Gait Training Goal 1, PT) modified independence  -AE     Distance (Gait Training Goal 1, PT) 500ft  -AE     Time Frame (Gait Training Goal 1, PT) short term goal (STG);5 days  -AE     Progress/Outcome (Gait Training Goal 1, PT) new goal  -AE       Row Name 09/30/23 1130          Stairs Goal 1 (PT)    Activity/Assistive Device (Stairs Goal 1, PT) ascending stairs;descending stairs;step-to-step  -AE     [place] Level/Cues Needed (Stairs Goal 1, PT) contact guard required  -AE     Number of Stairs (Stairs Goal 1, PT) 1  -AE     Time Frame (Stairs Goal 1, PT) short term goal (STG);5 days  -AE     Progress/Outcome (Stairs Goal 1, PT) new goal  -AE       Row Name 09/30/23 1130          Therapy Assessment/Plan (PT)    Planned Therapy Interventions (PT) balance training;bed mobility training;gait training;home exercise program;patient/family education;postural re-education;ROM (range of motion);stair training;strengthening;transfer training  -AE               User Key  (r) = Recorded By, (t) = Taken By, (c) = Cosigned By      Initials Name Provider Type    AE Erik Lamas, PT Physical Therapist                   Clinical Impression       Row Name 09/30/23 1126          Pain    Additional Documentation Pain Scale: FACES Pre/Post-Treatment (Group)  -AE       Row Name 09/30/23 1126          Pain Scale: FACES Pre/Post-Treatment    Pain: FACES Scale, Pretreatment 2-->hurts little bit  -AE     Posttreatment Pain Rating 2-->hurts little bit  -AE     Pain Location - neck  -AE     Pre/Posttreatment Pain Comment tolerated   -AE       Row Name 09/30/23 1126          Plan of Care Review    Plan of Care Reviewed With patient  -AE     Progress no change  -AE     Outcome Evaluation Pt presents with decreased functional independence, decreased endurance with activity, and decreased safety awareness. Pt ambulated 325ft with CGA-min A at times with SPC. Pt demo multiple LOB during session and very impulsive with all mobility. Recommend continued skilled IP PT interventions. Recommend D/C home with 24/7 assist, not safe at this time to d/c home alone without assist.  -AE       Row Name 09/30/23 1126          Therapy Assessment/Plan (PT)    Patient/Family Therapy Goals Statement (PT) Return home  -AE     Rehab Potential (PT) fair, will monitor progress closely  -AE     Criteria for Skilled Interventions Met (PT) yes  -AE     Therapy Frequency (PT) daily  -AE       Row Name 09/30/23 1126          Vital Signs    Pre Systolic BP Rehab 108  -AE     Pre Treatment Diastolic BP 72  -AE     Pretreatment Heart Rate (beats/min) 58  -AE     Posttreatment Heart Rate (beats/min) 72  -AE     Pre SpO2 (%) 99  -AE     O2 Delivery Pre Treatment nasal cannula  -AE     Intra SpO2 (%) 85  -AE     O2 Delivery Intra Treatment room air  -AE     Post SpO2 (%) 94  -AE     O2 Delivery Post Treatment nasal cannula  -AE     Pre Patient Position Supine  -AE     Intra Patient Position Standing  -AE     Post Patient Position Sitting  -AE       Row Name 09/30/23 1126          Positioning and Restraints    Pre-Treatment Position in bed  -AE     Post Treatment Position chair  -AE     In Chair notified nsg;reclined;call light within reach;encouraged to call for assist;exit alarm on;legs elevated  -AE               User Key  (r) = Recorded By, (t) = Taken By, (c) = Cosigned By      Initials Name Provider Type    AE Erik Lamas, PT Physical Therapist                   Outcome Measures       Row Name 09/30/23 1156          How much help from another person do you currently  need...    Turning from your back to your side while in flat bed without using bedrails? 3  -AE     Moving from lying on back to sitting on the side of a flat bed without bedrails? 3  -AE     Moving to and from a bed to a chair (including a wheelchair)? 3  -AE     Standing up from a chair using your arms (e.g., wheelchair, bedside chair)? 3  -AE     Climbing 3-5 steps with a railing? 3  -AE     To walk in hospital room? 3  -AE     AM-PAC 6 Clicks Score (PT) 18  -AE     Highest level of mobility 6 --> Walked 10 steps or more  -AE       Row Name 09/30/23 1156          Functional Assessment    Outcome Measure Options AM-PAC 6 Clicks Basic Mobility (PT)  -AE               User Key  (r) = Recorded By, (t) = Taken By, (c) = Cosigned By      Initials Name Provider Type    AE Erik Lamas, PT Physical Therapist                                 Physical Therapy Education       Title: PT OT SLP Therapies (In Progress)       Topic: Physical Therapy (In Progress)       Point: Mobility training (In Progress)       Learning Progress Summary             Patient Acceptance, E, NR by AE at 9/30/2023 1020                         Point: Home exercise program (Not Started)       Learner Progress:  Not documented in this visit.              Point: Body mechanics (In Progress)       Learning Progress Summary             Patient Acceptance, E, NR by AE at 9/30/2023 1020                         Point: Precautions (In Progress)       Learning Progress Summary             Patient Acceptance, E, NR by AE at 9/30/2023 1020                                         User Key       Initials Effective Dates Name Provider Type Discipline    AE 09/21/21 -  Erik Lamas, PT Physical Therapist PT                  PT Recommendation and Plan  Planned Therapy Interventions (PT): balance training, bed mobility training, gait training, home exercise program, patient/family education, postural re-education, ROM (range of motion), stair training,  strengthening, transfer training  Plan of Care Reviewed With: patient  Progress: no change  Outcome Evaluation: Pt presents with decreased functional independence, decreased endurance with activity, and decreased safety awareness. Pt ambulated 325ft with CGA-min A at times with SPC. Pt demo multiple LOB during session and very impulsive with all mobility. Recommend continued skilled IP PT interventions. Recommend D/C home with 24/7 assist, not safe at this time to d/c home alone without assist.     Time Calculation:   PT Evaluation Complexity  History, PT Evaluation Complexity: 3 or more personal factors and/or comorbidities  Examination of Body Systems (PT Eval Complexity): total of 3 or more elements  Clinical Presentation (PT Evaluation Complexity): evolving  Clinical Decision Making (PT Evaluation Complexity): moderate complexity  Overall Complexity (PT Evaluation Complexity): moderate complexity     PT Charges       Row Name 09/30/23 1158             Time Calculation    Start Time 1020  -AE      PT Received On 09/30/23  -AE      PT Goal Re-Cert Due Date 10/10/23  -AE         Untimed Charges    PT Eval/Re-eval Minutes 50  -AE         Total Minutes    Untimed Charges Total Minutes 50  -AE       Total Minutes 50  -AE                User Key  (r) = Recorded By, (t) = Taken By, (c) = Cosigned By      Initials Name Provider Type    AE Erik Lamas, PT Physical Therapist                  Therapy Charges for Today       Code Description Service Date Service Provider Modifiers Qty    [id number]  PT EVAL MOD COMPLEXITY 4 9/30/2023 Erik Lamas, PT GP 1            PT G-Codes  Outcome Measure Options: AM-PAC 6 Clicks Basic Mobility (PT)  AM-PAC 6 Clicks Score (PT): 18  PT Discharge Summary  Anticipated Discharge Disposition (PT): home with 24/7 care    Erik Lamas PT  9/30/2023

## 2023-09-30 NOTE — DISCHARGE SUMMARY
Patient Name: Rodolfo Hill  MRN: [id number]  : 1960  DOS: 2023    Attending: Roldan Gipson MD    Primary Care Provider: Elvis Garcia MD    Date of Admission:.2023  8:41 AM    Date of Discharge:  2023    Discharge Diagnosis:   Failed arthroplasty    Anxiety and depression    Current every day smoker    COPD (chronic obstructive pulmonary disease)    Sleep apnea    Hypertension    Hyperlipidemia    Chronic back pain    On home O2      Hospital Course    At admit:    Patient is a pleasant 63 y.o. male presented for scheduled surgery by Dr. Gipson.  He anticipates a right reverse shoulder revision today.       He had his original shoulder surgery 14 months ago and did well for the first few months.  About 5 months ago his shoulder became dislocated and he has had pain and limited range of motion ever since.       He has past medical history of COPD, current smoker, sleep apnea for which she wears a CPAP, hypertension, hyperlipidemia, and a remote stroke.  He denies further cardiac history.  He denies DVT/PE. ( Has home oxygen and nebulizer machine as well.)wy     When seen in preop, patient is resting comfortably in bed.  He denies chest pain, shortness of breath or nausea.     ( Seen preop, with subsequent notes indicating he later underwent the following procedure   1.) Explantation of right humeral and glenoid components, baseplate, glenosphere, humeral tray and poly liner  2) placement of right shoulder hemiarthroplasty- with CTA Humeral head.  Surgery was done under general anesthesia and a block, was tolerated well, he was admitted for further management.)wy    After admit:    Patient was provided pain medications as needed for pain control, along with interscalene nerve block infusion of Ropivacaine.    Adjustments were made to pain medications to optimize postop pain management.   Risks and benefits of opiate medications discussed with patient.  Luis report in  chart was reviewed prior to discharge.    The patient was seen by OT and was taught exercises for his right arm.  The patient used an IS for atelectasis prophylaxis and mechanicals for DVT prophylaxis.    Home medications were resumed as appropriate, and labs were monitored and remained fairly stable.     With the progress he has made, he is ready for DC home today.      The patient will have an interscalene nerve block ( instructed on it during this admit)  Discussed with patient regarding plan and he shows understanding and agreement.        Procedures Performed    DATE OF OPERATION: 9/29/2023     PREOPERATIVE DIAGNOSIS: failed right reverse total shoulder arthroplasty, baseplate loosening with severe glenoid erosion      POSTOPERATIVE DIAGNOSES:  same     PROCEDURES PERFORMED:  1.) Explantation of right humeral and glenoid components, baseplate, glenosphere, humeral tray and poly liner  2) placement of right shoulder hemiarthroplasty- with CTA Humeral head     SURGEON: Roldan Gipson MD    Pertinent Test Results:    I reviewed the patient's new clinical results.   Results from last 7 days   Lab Units 09/30/23  0621 09/29/23  1150   WBC 10*3/mm3 9.41 6.28   HEMOGLOBIN g/dL 11.2* 11.9*   HEMATOCRIT % 34.2* 37.4*   PLATELETS 10*3/mm3 220 224     Results from last 7 days   Lab Units 09/30/23  0621 09/29/23  1150   SODIUM mmol/L 137 140   POTASSIUM mmol/L 4.4 4.5   CHLORIDE mmol/L 100 100   CO2 mmol/L 30.0* 32.0*   BUN mg/dL 15 15   CREATININE mg/dL 0.72* 0.76   CALCIUM mg/dL 8.8 9.4   BILIRUBIN mg/dL  --  0.6   ALK PHOS U/L  --  85   ALT (SGPT) U/L  --  13   AST (SGOT) U/L  --  18   GLUCOSE mg/dL 120* 87     I reviewed the patient's new imaging including images and reports.      Occupational Therapy: OT Evaluation completed. Pt is A/Ox3 with cues and participates in therapy and family teaching with frequent redirection. Pt's sister is present for all teaching and per pt/sister arrangement have been made for pt to d/c  "home with his ex-wife for 24/7 care. Education completed for R shoulder precautions, sling teaching, and ADL retraining with instruction for nerve block and wound vac line mgmt to prevent dislodgement. RUE HEP completed with AROM hand/wrist and AAROM elbow. Pt is up with SPC support and CGAx1. Pt demostrates decreased safety awareness and will require assist for nerve block and wound vac mgmt with all activities. Sister acknowledges this recommendation and has been in phone contact with pt's ex-wife ensure 24/7 assist. OT will d/c at this time. Pt is set to d/c home today once portable O2 tank is delivered.       Physical therapy: Pt presents with decreased functional independence, decreased endurance with activity, and decreased safety awareness. Pt ambulated 325ft with CGA-min A at times with SPC. Pt demo multiple LOB during session and very impulsive with all mobility. Recommend continued skilled IP PT interventions. Recommend D/C home with 24/7 assist, not safe at this time to d/c home alone without assist.       Discharge Assessment:    Vital Signs  Visit Vitals  /67    Pulse 68   Temp 98.6 °F (37 °C) (Oral)   Resp 16   Ht 165.1 cm (65\")   Wt 56.7 kg (125 lb)   SpO2 96%   BMI 20.80 kg/m²     Temp (24hrs), Av.5 °F (36.4 °C), Min:96.9 °F (36.1 °C), Max:98.6 °F (37 °C)      General Appearance:    Alert, cooperative, in no acute distress   Lungs:     Clear to auscultation,respirations regular, even and   unlabored    Heart:    Regular rhythm and normal rate, normal S1 and S2    Abdomen:     Normal bowel sounds, no masses, no organomegaly, soft   non-tender, non-distended, no guarding, no rebound tenderness   Extremities:   RUE in a sling, CDI Aquacel dressing. Interscalene nerve block cath present. Distal pulses, cap refill, movements of fingers, wrist, intact.   Pulses:   Pulses palpable and equal bilaterally   Skin:   No bleeding, bruising or rash   Neurologic:   Cranial nerves 2 - 12 grossly intact "       Discharge Disposition: Home          Discharge Medications        New Medications        Instructions Start Date   docusate sodium 100 MG capsule  Commonly known as: Colace   100 mg, Oral, 2 Times Daily      ropivacaine 0.2 % infusion (INFUSYSTEM)  Commonly known as: NAROPIN   1 mL/hr (2 mg/hr), Peripheral Nerve, Continuous             Continue These Medications        Instructions Start Date   ASPIR 81 MG EC tablet  Generic drug: aspirin   TAKE 1 TABLET BY MOUTH EVERY DAY      atorvastatin 40 MG tablet  Commonly known as: LIPITOR   No dose, route, or frequency recorded.      baclofen 20 MG tablet  Commonly known as: LIORESAL   No dose, route, or frequency recorded.      carvedilol 12.5 MG tablet  Commonly known as: COREG   12.5 mg, Oral, 2 Times Daily      clotrimazole 1 % cream  Commonly known as: LOTRIMIN   1 application , Topical, 2 Times Daily      [employer] D3 125 MCG (5000 UT) capsule capsule  Generic drug: vitamin D3   No dose, route, or frequency recorded.      diazePAM 5 MG tablet  Commonly known as: VALIUM   5 mg, Oral, 2 Times Daily PRN      diclofenac 1 % gel gel  Commonly known as: VOLTAREN   4 g, Topical, 4 Times Daily PRN      ferrous sulfate 325 (65 FE) MG tablet   No dose, route, or frequency recorded.      folic acid 1 MG tablet  Commonly known as: FOLVITE   No dose, route, or frequency recorded.      gabapentin 800 MG tablet  Commonly known as: NEURONTIN   1 tab(s) orally 3 times a day for 28 days      lisinopril 10 MG tablet  Commonly known as: PRINIVIL,ZESTRIL   10 mg, Oral, Daily      meloxicam 15 MG tablet  Commonly known as: MOBIC   15 mg, Oral, Daily      methotrexate 2.5 MG tablet   20 mg, Oral, Weekly      nicotine polacrilex 4 MG gum  Commonly known as: NICORETTE   No dose, route, or frequency recorded.      oxyCODONE 20 MG tablet  Commonly known as: ROXICODONE   15 mg, Oral, Every 6 Hours PRN      pantoprazole 40 MG EC tablet  Commonly known as: PROTONIX   No dose, route, or frequency  recorded.      vitamin B-12 1000 MCG tablet  Commonly known as: CYANOCOBALAMIN   TAKE 1 TABLET BY MOUTH EVERY DAY FOR 90 DAYS             Stop These Medications      mupirocin 2 % nasal ointment  Commonly known as: BACTROBAN              Discharge Diet: Regular diet      Activity at Discharge: MARITO Benavides      Follow-up Appointments  Dr. Gipson per his orders      BECKI Salazar  09/30/23  12:34 EDT

## 2023-09-30 NOTE — PLAN OF CARE
Problem: Adult Inpatient Plan of Care  Goal: Absence of Hospital-Acquired Illness or Injury  Intervention: Identify and Manage Fall Risk  Recent Flowsheet Documentation  Taken 9/30/2023 1400 by Maged Lyn RN  Safety Promotion/Fall Prevention:   activity supervised   clutter free environment maintained   assistive device/personal items within reach   toileting scheduled   safety round/check completed   room organization consistent   nonskid shoes/slippers when out of bed   gait belt   fall prevention program maintained  Taken 9/30/2023 1200 by Maged Lyn RN  Safety Promotion/Fall Prevention:   activity supervised   clutter free environment maintained   assistive device/personal items within reach   toileting scheduled   safety round/check completed   room organization consistent   nonskid shoes/slippers when out of bed   gait belt   fall prevention program maintained  Taken 9/30/2023 1015 by Maged Lyn RN  Safety Promotion/Fall Prevention:   activity supervised   clutter free environment maintained   assistive device/personal items within reach   toileting scheduled   safety round/check completed   room organization consistent   nonskid shoes/slippers when out of bed   gait belt   fall prevention program maintained  Taken 9/30/2023 0800 by Maged Lyn RN  Safety Promotion/Fall Prevention:   activity supervised   clutter free environment maintained   assistive device/personal items within reach   toileting scheduled   safety round/check completed   room organization consistent   nonskid shoes/slippers when out of bed   gait belt   fall prevention program maintained  Intervention: Prevent Skin Injury  Recent Flowsheet Documentation  Taken 9/30/2023 1400 by Maged Lyn RN  Body Position: position changed independently  Skin Protection: adhesive use limited  Taken 9/30/2023 1200 by Maged Lyn RN  Body Position: position changed independently  Taken  9/30/2023 1015 by Maged Lyn RN  Body Position: position changed independently  Skin Protection: adhesive use limited  Taken 9/30/2023 1000 by Maged Lyn RN  Body Position: position changed independently  Taken 9/30/2023 0800 by Maged Lyn RN  Body Position: position changed independently  Skin Protection: adhesive use limited  Intervention: Prevent and Manage VTE (Venous Thromboembolism) Risk  Recent Flowsheet Documentation  Taken 9/30/2023 1200 by Maged Lyn RN  Activity Management: up in chair  VTE Prevention/Management:   bilateral   sequential compression devices on  Taken 9/30/2023 1015 by Maged Lyn RN  Activity Management: up in chair  VTE Prevention/Management:   bilateral   sequential compression devices on  Taken 9/30/2023 1000 by Maged Lyn RN  Activity Management: up in chair  Taken 9/30/2023 0800 by Maged Lyn RN  VTE Prevention/Management:   bilateral   sequential compression devices on  Goal: Optimal Comfort and Wellbeing  Intervention: Monitor Pain and Promote Comfort  Recent Flowsheet Documentation  Taken 9/30/2023 1400 by Maged Lyn RN  Pain Management Interventions:   see MAR   pillow support provided   position adjusted   cold applied  Taken 9/30/2023 1200 by Maged Lyn RN  Pain Management Interventions:   cold applied   pillow support provided   position adjusted   see MAR  Intervention: Provide Person-Centered Care  Recent Flowsheet Documentation  Taken 9/30/2023 1400 by Maged Lyn RN  Trust Relationship/Rapport: care explained  Taken 9/30/2023 1200 by Maged Lyn RN  Trust Relationship/Rapport:   care explained   reassurance provided  Taken 9/30/2023 1015 by Maged Lyn RN  Trust Relationship/Rapport:   care explained   reassurance provided  Taken 9/30/2023 1000 by Maged Lyn RN  Trust Relationship/Rapport:   care explained   reassurance provided  Taken  9/30/2023 0800 by Maged Lyn, RN  Trust Relationship/Rapport:   care explained   reassurance provided   Goal Outcome Evaluation:

## 2023-09-30 NOTE — PLAN OF CARE
Problem: Adult Inpatient Plan of Care  Goal: Plan of Care Review  Outcome: Ongoing, Progressing  Goal: Patient-Specific Goal (Individualized)  Outcome: Ongoing, Progressing  Goal: Absence of Hospital-Acquired Illness or Injury  Outcome: Ongoing, Progressing  Intervention: Identify and Manage Fall Risk  Recent Flowsheet Documentation  Taken 9/30/2023 0000 by Allison Urbano RN  Safety Promotion/Fall Prevention:   assistive device/personal items within reach   clutter free environment maintained   fall prevention program maintained   nonskid shoes/slippers when out of bed   room organization consistent   safety round/check completed  Taken 9/29/2023 2200 by Allison Urbano RN  Safety Promotion/Fall Prevention:   assistive device/personal items within reach   clutter free environment maintained   fall prevention program maintained   gait belt   nonskid shoes/slippers when out of bed   room organization consistent   safety round/check completed  Taken 9/29/2023 2051 by Allison Urbano RN  Safety Promotion/Fall Prevention:   assistive device/personal items within reach   clutter free environment maintained   fall prevention program maintained   gait belt   nonskid shoes/slippers when out of bed   room organization consistent   safety round/check completed  Intervention: Prevent Skin Injury  Recent Flowsheet Documentation  Taken 9/30/2023 0000 by Allison Urbano RN  Body Position: position changed independently  Skin Protection:   adhesive use limited   tubing/devices free from skin contact  Taken 9/29/2023 2200 by Allison Urbano RN  Body Position: position changed independently  Skin Protection:   adhesive use limited   tubing/devices free from skin contact  Taken 9/29/2023 2051 by Allison Urbano RN  Body Position: position changed independently  Skin Protection:   adhesive use limited   tubing/devices free from skin contact  Intervention: Prevent and Manage VTE (Venous Thromboembolism) Risk  Recent Flowsheet  Documentation  Taken 9/29/2023 2200 by Allison Urbano RN  Activity Management: activity encouraged  Taken 9/29/2023 2051 by Allison Urbano RN  Activity Management:   activity encouraged   ambulated to bathroom  VTE Prevention/Management:   bilateral   sequential compression devices on  Goal: Optimal Comfort and Wellbeing  Outcome: Ongoing, Progressing  Goal: Readiness for Transition of Care  Outcome: Ongoing, Progressing  Intervention: Mutually Develop Transition Plan  Recent Flowsheet Documentation  Taken 9/29/2023 2001 by Allison Urbano RN  Transportation Anticipated: family or friend will provide  Patient/Family Anticipated Services at Transition: none  Patient/Family Anticipates Transition to: home     Problem: Skin Injury Risk Increased  Goal: Skin Health and Integrity  Outcome: Ongoing, Progressing  Intervention: Optimize Skin Protection  Recent Flowsheet Documentation  Taken 9/30/2023 0000 by Allison Urbano RN  Pressure Reduction Techniques: frequent weight shift encouraged  Head of Bed (HOB) Positioning: HOB elevated  Pressure Reduction Devices:   positioning supports utilized   pressure-redistributing mattress utilized  Skin Protection:   adhesive use limited   tubing/devices free from skin contact  Taken 9/29/2023 2200 by Allison Urbano RN  Pressure Reduction Techniques: frequent weight shift encouraged  Head of Bed (HOB) Positioning: HOB elevated  Pressure Reduction Devices:   positioning supports utilized   pressure-redistributing mattress utilized  Skin Protection:   adhesive use limited   tubing/devices free from skin contact  Taken 9/29/2023 2051 by Allison Urbano RN  Pressure Reduction Techniques: frequent weight shift encouraged  Head of Bed (HOB) Positioning: HOB elevated  Pressure Reduction Devices:   pressure-redistributing mattress utilized   positioning supports utilized  Skin Protection:   adhesive use limited   tubing/devices free from skin contact     Problem: Fall Injury Risk  Goal: Absence of  Fall and Fall-Related Injury  Outcome: Ongoing, Progressing  Intervention: Promote Injury-Free Environment  Recent Flowsheet Documentation  Taken 9/30/2023 0000 by Allison Urbano RN  Safety Promotion/Fall Prevention:   assistive device/personal items within reach   clutter free environment maintained   fall prevention program maintained   nonskid shoes/slippers when out of bed   room organization consistent   safety round/check completed  Taken 9/29/2023 2200 by Allison Urbano RN  Safety Promotion/Fall Prevention:   assistive device/personal items within reach   clutter free environment maintained   fall prevention program maintained   gait belt   nonskid shoes/slippers when out of bed   room organization consistent   safety round/check completed  Taken 9/29/2023 2051 by Allison Urbano RN  Safety Promotion/Fall Prevention:   assistive device/personal items within reach   clutter free environment maintained   fall prevention program maintained   gait belt   nonskid shoes/slippers when out of bed   room organization consistent   safety round/check completed     Problem: COPD (Chronic Obstructive Pulmonary Disease) Comorbidity  Goal: Maintenance of COPD Symptom Control  Outcome: Ongoing, Progressing     Problem: Hypertension Comorbidity  Goal: Blood Pressure in Desired Range  Outcome: Ongoing, Progressing     Problem: Obstructive Sleep Apnea Risk or Actual Comorbidity Management  Goal: Unobstructed Breathing During Sleep  Outcome: Ongoing, Progressing   Goal Outcome Evaluation:

## 2023-09-30 NOTE — PLAN OF CARE
Goal Outcome Evaluation:  Plan of Care Reviewed With: patient        Progress: no change  Outcome Evaluation: Pt presents with decreased functional independence, decreased endurance with activity, and decreased safety awareness. Pt ambulated 325ft with CGA-min A at times with SPC. Pt demo multiple LOB during session and very impulsive with all mobility. Recommend continued skilled IP PT interventions. Recommend D/C home with 24/7 assist, not safe at this time to d/c home alone without assist.      Anticipated Discharge Disposition (PT): home with 24/7 care

## 2023-09-30 NOTE — THERAPY DISCHARGE NOTE
Acute Care - Occupational Therapy Discharge  [facility]    Patient Name: Rodolfo Hill  : 1960    MRN: [id number]                              Today's Date: 2023       Admit Date: 2023    Visit Dx:     ICD-10-CM ICD-9-CM   1. Cervical radiculopathy  M54.12 723.4   2. Shoulder pain  M25.519 719.41     Patient Active Problem List   Diagnosis    Pathologic compression fracture of vertebra    Brachial plexus injury    Cervical spondylosis without myelopathy    DDD (degenerative disc disease), cervical    Cervical spinal stenosis    Rheumatoid arthritis involving multiple sites    Gait disturbance    Anxiety and depression    Current every day smoker    Encounter for smoking cessation counseling    Occipital neuralgia    Physical deconditioning    Connective tissue stenosis of neural canal of cervical region    Cervical radiculopathy    Failed arthroplasty    COPD (chronic obstructive pulmonary disease)    Sleep apnea    Hypertension    Hyperlipidemia    Chronic back pain    On home O2     Past Medical History:   Diagnosis Date    Anxiety     COPD (chronic obstructive pulmonary disease)     Hyperlipidemia     Hypertension     Myocardial infarct     Osteoporosis     Rheumatoid arthritis     Sleep apnea with use of continuous positive airway pressure (CPAP)      Past Surgical History:   Procedure Laterality Date    CARPAL TUNNEL RELEASE      KYPHOPLASTY N/A 2017    Procedure: KYPHOPLASTY T8-9;  Surgeon: Fahad Santana MD;  Location: [facility];  Service:     SHOULDER SURGERY Left     [facility]    SHOULDER SURGERY Right 2022    [facility]      General Information       Row Name 23 1518          OT Time and Intention    Document Type discharge evaluation/summary;therapy note (daily note)  -TB     Mode of Treatment occupational therapy;individual therapy  -TB       Row Name 23 1518          General Information    Patient Profile  Reviewed yes  -TB     Prior Level of Function independent:;all household mobility;transfer;bed mobility;ADL's  -TB     Existing Precautions/Restrictions fall;oxygen therapy device and L/min;right;shoulder;non-weight bearing;other (see comments)  s/p Revision to R hemiarthroplasty, IS nerve cath, sling with abduction pillow, wound vac  -TB     Barriers to Rehab medically complex;previous functional deficit  -TB       Row Name 09/30/23 1518          Occupational Profile    Reason for Services/Referral (Occupational Profile) Occupational decline  -TB     Environmental Supports and Barriers (Occupational Profile) Pt lives alone at baseline. Pt will d/c to ex-wife's home for 24/7 assist. Sister who is an RN is present for teaching today and supportive. She will assist with nerve catheter and wound vac removal when appropriate.   -TB       Row Name 09/30/23 1518          Living Environment    People in Home alone;other (see comments)  Ex wife has agreed to provide 24/7 care at d/c  -TB       Row Name 09/30/23 1518          Home Main Entrance    Number of Stairs, Main Entrance one  -TB       Row Name 09/30/23 1518          Stairs Within Home, Primary    Number of Stairs, Within Home, Primary none  -TB       Row Name 09/30/23 1518          Cognition    Orientation Status (Cognition) oriented x 3;verbal cues/prompts needed for orientation;disoriented to;time  -TB       Row Name 09/30/23 1518          Safety Issues, Functional Mobility    Safety Issues Affecting Function (Mobility) awareness of need for assistance;insight into deficits/self-awareness;impulsivity;judgment;problem-solving;safety precaution awareness;safety precautions follow-through/compliance;sequencing abilities  -TB     Impairments Affecting Function (Mobility) balance;endurance/activity tolerance;pain;strength;range of motion (ROM);postural/trunk control;sensation/sensory awareness  -TB     Comment, Safety Issues/Impairments (Mobility) Pt is up with SPC  support and CGAx1. Pt demostrates decreased safety awareness and will require assist for nerve block and wound vac mgmt with all activities. Sister present for teaching and acknowledges recommendation.  -TB               User Key  (r) = Recorded By, (t) = Taken By, (c) = Cosigned By      Initials Name Provider Type    TB Zuleyma López OT Occupational Therapist                   Mobility/ADL's       Row Name 09/30/23 1524          Bed Mobility    Bed Mobility supine-sit;scooting/bridging  -TB     Scooting/Bridging [place] (Bed Mobility) contact guard;verbal cues;1 person assist  -TB     Supine-Sit [place] (Bed Mobility) contact guard;1 person assist;verbal cues  -TB     Bed Mobility, Safety Issues decreased use of arms for pushing/pulling  -TB     Assistive Device (Bed Mobility) head of bed elevated;bed rails  -TB     Comment, (Bed Mobility) Education, cues, and assist to maintain RUE NWB precautions. Assist to detangle and protect wound vac and nerve block lines.  -TB       Row Name 09/30/23 1524          Transfers    Transfers sit-stand transfer;stand-sit transfer;bed-chair transfer  -TB     Comment, (Transfers) Education, cues, and assist for precautions, sequencing, and safety. Requires assist for line mgmt.  -TB       Row Name 09/30/23 1524          Bed-Chair Transfer    Bed-Chair [place] (Transfers) contact guard;1 person assist;verbal cues  -TB     Assistive Device (Bed-Chair Transfers) cane, straight  -TB       Row Name 09/30/23 1524          Sit-Stand Transfer    Sit-Stand [place] (Transfers) contact guard;1 person assist;verbal cues  -TB     Assistive Device (Sit-Stand Transfers) cane, straight  -TB       Row Name 09/30/23 1524          Stand-Sit Transfer    Stand-Sit [place] (Transfers) contact guard;1 person assist;verbal cues  -TB     Assistive Device (Stand-Sit Transfers) cane, straight  -TB       Row Name 09/30/23 1524          Functional Mobility    Functional  Mobility- Ind. Level contact guard assist;1 person;verbal cues required  -TB     Functional Mobility- Device cane, straight  -TB     Functional Mobility- Safety Issues balance decreased during turns;sequencing ability decreased  -TB     Functional Mobility- Comment Pt is up with SPC support and CGAx1. Pt demostrates decreased safety awareness and will require assist for nerve block and wound vac mgmt with all activities. Sister present for teaching and acknowledges recommendation.  -       Row Name 09/30/23 1524          Activities of Daily Living    BADL Assessment/Intervention bathing;upper body dressing;lower body dressing;grooming;feeding;toileting  -TB       Row Name 09/30/23 1524          Mobility    Extremity Weight-bearing Status right upper extremity  -TB     Right Upper Extremity (Weight-bearing Status) non weight-bearing (NWB)   -       Row Name 09/30/23 1524          Bathing Assessment/Intervention    [place] Level (Bathing) upper body;bathing skills;maximum assist (25% patient effort);verbal cues  -TB     Position (Bathing) edge of bed sitting  -TB     Comment, (Bathing) Education completed for R shoulder precautions, nerve block and wound vac precautions (no showers until lines removed), and axilla care via gentle pendulum. Pt's sister present for teaching. Cues and assist to maintain precautions once sling removed.  -       Row Name 09/30/23 1524          Upper Body Dressing Assessment/Training    [place] Level (Upper Body Dressing) don;front opening garment;maximum assist (25% patient effort);verbal cues  sling mgmt/proper fit/wear schedule  -TB     Position (Upper Body Dressing) edge of bed sitting  -TB     Comment, (Upper Body Dressing) Education completed for R shoulder precautions, sling teaching, and ADL retraining with instruction for wound vac and nerve block line mgmt to prevent dislodgement. Pt's sister present for all teaching.  -       Row Name 09/30/23 1524           Lower Body Dressing Assessment/Training    [place] Level (Lower Body Dressing) don;pants/bottoms;moderate assist (50% patient effort);verbal cues  -TB     Position (Lower Body Dressing) edge of bed sitting;supported standing  -TB       Row Name 09/30/23 1524          Grooming Assessment/Training    [place] Level (Grooming) minimum assist (75% patient effort);hair care, combing/brushing  -TB     Position (Grooming) supported sitting  -TB       Row Name 09/30/23 1524          Self-Feeding Assessment/Training    [place] Level (Feeding) independent;liquids to mouth  -TB     Position (Self-Feeding) supported sitting  -TB       Row Name 09/30/23 1524          Toileting Assessment/Training    [place] Level (Toileting) supervision  -TB     Assistive Devices (Toileting) urinal  -TB     Position (Toileting) edge of bed sitting  -TB               User Key  (r) = Recorded By, (t) = Taken By, (c) = Cosigned By      Initials Name Provider Type     Zuleyma López OT Occupational Therapist                   Obj/Interventions       Row Name 09/30/23 1531          Sensory Assessment (Somatosensory)    Sensory Assessment (Somatosensory) right UE  -TB     Sensory Subjective Reports numbness;tingling  -TB     Sensory Assessment RUE IS nerve block infusing  -       Row Name 09/30/23 1531          Range of Motion Comprehensive    Comment, General Range of Motion LUE AROM WFL for self-care performance. RUE hand, wrist, elbow A/AROM WFL.  -       Row Name 09/30/23 1531          Strength Comprehensive (MMT)    Comment, General Manual Muscle Testing (MMT) Assessment Generalized weakness/deconditioned  -       Row Name 09/30/23 1531          Elbow/Forearm (Therapeutic Exercise)    Elbow/Forearm (Therapeutic Exercise) AAROM (active assistive range of motion);AROM (active range of motion)  -TB     Elbow/Forearm AROM (Therapeutic Exercise) right;supination;pronation;sitting;10 repetitions  -      Elbow/Forearm AAROM (Therapeutic Exercise) right;flexion;extension;sitting;10 repetitions  -       Row Name 09/30/23 1531          Wrist (Therapeutic Exercise)    Wrist (Therapeutic Exercise) AROM (active range of motion)  -     Wrist AROM (Therapeutic Exercise) right;flexion;extension;10 repetitions  -       Row Name 09/30/23 1531          Hand (Therapeutic Exercise)    Hand (Therapeutic Exercise) AROM (active range of motion)  -     Hand AROM/AAROM (Therapeutic Exercise) right;AROM (active range of motion);finger flexion;finger extension;10 repetitions  -       Row Name 09/30/23 1531          Motor Skills    Therapeutic Exercise hand;wrist;elbow/forearm  Education completed for RUE HEP per MD parameters @ hand, wrist, and elbow only. Pt requires cues and assist. Pt's sister present for teaching.  -       Row Name 09/30/23 1531          Balance    Balance Assessment sitting dynamic balance;sit to stand dynamic balance;standing dynamic balance;sitting static balance  -     Static Sitting Balance supervision  -     Dynamic Sitting Balance standby assist  -     Position, Sitting Balance unsupported;sitting in chair;sitting edge of bed  -     Sit to Stand Dynamic Balance contact guard;1-person assist;verbal cues  -TB     Static Standing Balance contact guard;1-person assist;verbal cues  -TB     Position/Device Used, Standing Balance supported;cane, straight  -     Balance Interventions sitting;standing;sit to stand;supported;dynamic reaching;occupation based/functional task;UE activity with balance activity  -     Comment, Balance Pt is unsteady in standing and with gait. Up with SPC support and asssist x1. Pt demostrates decreased safety awareness and will require assist for nerve block and wound vac mgmt with all activities. Sister present for teaching and acknowledges recommendation.  -               User Key  (r) = Recorded By, (t) = Taken By, (c) = Cosigned By      Initials Name Provider  Type    TB Zuleyma López, MACEY Occupational Therapist                   Goals/Plan    No documentation.                  Clinical Impression       Row Name 09/30/23 1535          Pain Assessment    Pain Intervention(s) Ambulation/increased activity;Repositioned;Other (Comment)  RUE IS nerve catheter infusing  -TB     Additional Documentation Pain Scale: FACES Pre/Post-Treatment (Group)  -TB       Row Name 09/30/23 1535          Pain Scale: FACES Pre/Post-Treatment    Pain: FACES Scale, Pretreatment 2-->hurts little bit  -TB     Posttreatment Pain Rating 2-->hurts little bit  -TB     Pain Location - Side/Orientation Right  -TB     Pain Location generalized  -TB     Pain Location - shoulder  -TB     Pre/Posttreatment Pain Comment Pt tolerates HEP and d/c ADL activities well  -TB       Row Name 09/30/23 1535          Plan of Care Review    Plan of Care Reviewed With patient;sibling  -TB     Outcome Evaluation OT Evaluation completed. Pt is A/Ox3 with cues and participates in therapy and family teaching with frequent redirection. Pt's sister is present for all teaching and per pt/sister arrangement have been made for pt to d/c home with his ex-wife for 24/7 care. Education completed for R shoulder precautions, sling teaching, and ADL retraining with instruction for nerve block and wound vac line mgmt to prevent dislodgement. RUE HEP completed with AROM hand/wrist and AAROM elbow. Pt is up with SPC support and CGAx1. Pt demostrates decreased safety awareness and will require assist for nerve block and wound vac mgmt with all activities. Sister acknowledges this recommendation and has been in phone contact with pt's ex-wife ensure 24/7 assist. OT will d/c at this time. Pt is set to d/c home today once portable O2 tank is delivered.  -TB       Row Name 09/30/23 1535          Therapy Assessment/Plan (OT)    Therapy Frequency (OT) evaluation only  -TB       Row Name 09/30/23 1535          Therapy Plan Review/Discharge  Plan (OT)    Anticipated Discharge Disposition (OT) home with 24/7 care  -TB       Row Name 09/30/23 1535          Vital Signs    Pre Systolic BP Rehab --  RN cleared OT  -TB     Pre SpO2 (%) 92  -TB     O2 Delivery Pre Treatment nasal cannula  -TB     Intra SpO2 (%) 87  -TB     O2 Delivery Intra Treatment room air  -TB     Post SpO2 (%) 93  -TB     O2 Delivery Post Treatment nasal cannula  -TB     Pre Patient Position Supine  -TB     Intra Patient Position Standing  -TB     Post Patient Position Sitting  -TB       Row Name 09/30/23 1535          Positioning and Restraints    Pre-Treatment Position in bed  -TB     Post Treatment Position chair  -TB     In Chair notified nsg;reclined;call light within reach;encouraged to call for assist;exit alarm on;with family/caregiver;legs elevated;with brace  -TB               User Key  (r) = Recorded By, (t) = Taken By, (c) = Cosigned By      Initials Name Provider Type    TB Zuleyma López, OT Occupational Therapist                   Outcome Measures       Row Name 09/30/23 1542          How much help from another is currently needed...    Putting on and taking off regular lower body clothing? 2  -TB     Bathing (including washing, rinsing, and drying) 2  -TB     Toileting (which includes using toilet bed pan or urinal) 2  -TB     Putting on and taking off regular upper body clothing 2  -TB     Taking care of personal grooming (such as brushing teeth) 3  -TB     Eating meals 3  -TB     AM-PAC 6 Clicks Score (OT) 14  -TB       Row Name 09/30/23 1156          How much help from another person do you currently need...    Turning from your back to your side while in flat bed without using bedrails? 3  -AE     Moving from lying on back to sitting on the side of a flat bed without bedrails? 3  -AE     Moving to and from a bed to a chair (including a wheelchair)? 3  -AE     Standing up from a chair using your arms (e.g., wheelchair, bedside chair)? 3  -AE     Climbing 3-5  steps with a railing? 3  -AE     To walk in hospital room? 3  -AE     AM-PAC 6 Clicks Score (PT) 18  -AE     Highest level of mobility 6 --> Walked 10 steps or more  -AE       Row Name 09/30/23 1542 09/30/23 1156       Functional Assessment    Outcome Measure Options AM-PAC 6 Clicks Daily Activity (OT)  -TB AM-PAC 6 Clicks Basic Mobility (PT)  -AE              User Key  (r) = Recorded By, (t) = Taken By, (c) = Cosigned By      Initials Name Provider Type    TB Zuleyma López, OT Occupational Therapist    AE Erik Lamas, PT Physical Therapist                  Occupational Therapy Education       Title: PT OT SLP Therapies (In Progress)       Topic: Occupational Therapy (In Progress)       Point: ADL training (Done)       Description:   Instruct learner(s) on proper safety adaptation and remediation techniques during self care or transfers.   Instruct in proper use of assistive devices.                  Learning Progress Summary             Patient Acceptance, E,D, VU,NR by TB at 9/30/2023 1542    Comment: Pt's sister present for all teaching. Arrangements have been made for pt to d/c home with ex-wife for 24/7 assist   Family Acceptance, E,D, VU,NR by TB at 9/30/2023 1542    Comment: Pt's sister present for all teaching. Arrangements have been made for pt to d/c home with ex-wife for 24/7 assist                         Point: Home exercise program (Done)       Description:   Instruct learner(s) on appropriate technique for monitoring, assisting and/or progressing therapeutic exercises/activities.                  Learning Progress Summary             Patient Acceptance, E,D, VU,NR by TB at 9/30/2023 1542    Comment: Pt's sister present for all teaching. Arrangements have been made for pt to d/c home with ex-wife for 24/7 assist   Family Acceptance, E,D, VU,NR by TB at 9/30/2023 1542    Comment: Pt's sister present for all teaching. Arrangements have been made for pt to d/c home with ex-wife for 24/7  assist                         Point: Precautions (Done)       Description:   Instruct learner(s) on prescribed precautions during self-care and functional transfers.                  Learning Progress Summary             Patient Acceptance, E,D, VU,NR by TB at 9/30/2023 1542    Comment: Pt's sister present for all teaching. Arrangements have been made for pt to d/c home with ex-wife for 24/7 assist   Family Acceptance, E,D, VU,NR by TB at 9/30/2023 1542    Comment: Pt's sister present for all teaching. Arrangements have been made for pt to d/c home with ex-wife for 24/7 assist                         Point: Body mechanics (Not Started)       Description:   Instruct learner(s) on proper positioning and spine alignment during self-care, functional mobility activities and/or exercises.                  Learner Progress:  Not documented in this visit.                              User Key       Initials Effective Dates Name Provider Type Discipline     07/11/23 -  Zuleyma López, OT Occupational Therapist OT                  OT Recommendation and Plan  Therapy Frequency (OT): evaluation only  Plan of Care Review  Plan of Care Reviewed With: patient, sibling  Outcome Evaluation: OT Evaluation completed. Pt is A/Ox3 with cues and participates in therapy and family teaching with frequent redirection. Pt's sister is present for all teaching and per pt/sister arrangement have been made for pt to d/c home with his ex-wife for 24/7 care. Education completed for R shoulder precautions, sling teaching, and ADL retraining with instruction for nerve block and wound vac line mgmt to prevent dislodgement. RUE HEP completed with AROM hand/wrist and AAROM elbow. Pt is up with SPC support and CGAx1. Pt demostrates decreased safety awareness and will require assist for nerve block and wound vac mgmt with all activities. Sister acknowledges this recommendation and has been in phone contact with pt's ex-wife ensure 24/7 assist.  OT will d/c at this time. Pt is set to d/c home today once portable O2 tank is delivered.  Plan of Care Reviewed With: patient, sibling  Outcome Evaluation: OT Evaluation completed. Pt is A/Ox3 with cues and participates in therapy and family teaching with frequent redirection. Pt's sister is present for all teaching and per pt/sister arrangement have been made for pt to d/c home with his ex-wife for 24/7 care. Education completed for R shoulder precautions, sling teaching, and ADL retraining with instruction for nerve block and wound vac line mgmt to prevent dislodgement. RUE HEP completed with AROM hand/wrist and AAROM elbow. Pt is up with SPC support and CGAx1. Pt demostrates decreased safety awareness and will require assist for nerve block and wound vac mgmt with all activities. Sister acknowledges this recommendation and has been in phone contact with pt's ex-wife ensure 24/7 assist. OT will d/c at this time. Pt is set to d/c home today once portable O2 tank is delivered.     Time Calculation:   Evaluation Complexity (OT)  Review Occupational Profile/Medical/Therapy History Complexity: expanded/moderate complexity  Assessment, Occupational Performance/Identification of Deficit Complexity: 3-5 performance deficits  Clinical Decision Making Complexity (OT): detailed assessment/moderate complexity  Overall Complexity of Evaluation (OT): moderate complexity     Time Calculation- OT       Row Name 09/30/23 1435             Time Calculation- OT    OT Start Time 1435  -TB      OT Received On 09/30/23  -TB         Timed Charges    17387 - OT Therapeutic Activity Minutes 38  -TB         Untimed Charges    OT Eval/Re-eval Minutes 38  -TB         Total Minutes    Timed Charges Total Minutes 38  -TB      Untimed Charges Total Minutes 38  -TB       Total Minutes 76  -TB                User Key  (r) = Recorded By, (t) = Taken By, (c) = Cosigned By      Initials Name Provider Type    TB Zuleyma López OT  Occupational Therapist                  Therapy Charges for Today       Code Description Service Date Service Provider Modifiers Qty    [id number]  OT THERAPEUTIC ACT EA 15 MIN 9/30/2023 Zuleyma López OT GO 3    [id number]  OT EVAL MOD COMPLEXITY 3 9/30/2023 Zuleyma López OT GO 1               OT Discharge Summary  Anticipated Discharge Disposition (OT): home with 24/7 care    Zuleyma López OT  9/30/2023

## 2023-09-30 NOTE — PLAN OF CARE
Problem: Adult Inpatient Plan of Care  Goal: Plan of Care Review  Recent Flowsheet Documentation  Taken 9/30/2023 6375 by Zuleyma López OT  Plan of Care Reviewed With:   patient   sibling  Outcome Evaluation: OT Evaluation completed. Pt is A/Ox3 with cues and participates in therapy and family teaching with frequent redirection. Pt's sister is present for all teaching and per pt/sister arrangement have been made for pt to d/c home with his ex-wife for 24/7 care. Education completed for R shoulder precautions, sling teaching, and ADL retraining with instruction for nerve block and wound vac line mgmt to prevent dislodgement. RUE HEP completed with AROM hand/wrist and AAROM elbow. Pt is up with SPC support and CGAx1. Pt demostrates decreased safety awareness and will require assist for nerve block and wound vac mgmt with all activities. Sister acknowledges this recommendation and has been in phone contact with pt's ex-wife ensure 24/7 assist. OT will d/c at this time. Pt is set to d/c home today once portable O2 tank is delivered.

## 2023-09-30 NOTE — PROGRESS NOTES
[facility]    Acute pain service Inpatient Progress Note    Patient Name: Rodolfo Hill  :  1960  MRN:  [id number]        Acute Pain  Service Inpatient Progress Note:    Analgesia:Good  Pain Score:4/10  LOC: alert and awake  Resp Status: room air  Cardiac: VS stable  Side Effects:None  Catheter Site:clean  Infusion rate: Ext/Pop: Basal: 1ml/hr, PIB: 5ml q 2 h, PCA: 5 ml q 30 min  Dosing/Volume: ropivacaine 0.2%  Catheter Plan:Catheter to remain Insitu and Continue catheter infusion rate unchanged

## 2023-09-30 NOTE — CASE MANAGEMENT/SOCIAL WORK
Discharge Planning Assessment  [facility]     Patient Name: Rodolfo Hill  MRN: [id number]  Today's Date: 9/30/2023    Admit Date: 9/29/2023    Plan: Home                   Discharge Plan       Row Name 09/30/23 0057       Plan    Plan Home    Patient/Family in Agreement with Plan yes    Plan Comments CM received referral for O2 tanks for Mr. Hill for transport home.  Spoke to Mr. Hill's wife, and she states that the patient has O2 from [facility].  The patient is requiring continuous O2 at this time.  Contacted [facility] and they will deliver a portable tank to the patient's hospital room today.    Final Discharge Disposition Code 01 - home or self-care                  Continued Care and Services - Admitted Since 9/29/2023       Durable Medical Equipment       Service Provider Request Status Selected Services Address Phone Fax Patient Preferred    [facility] - [facility]  Selected Oxygen Equipment and Accessories [address] [id number] [phone] --                  Expected Discharge Date and Time       Expected Discharge Date Expected Discharge Time    Sep 30, 2023                   Shirin Alvarez RN

## 2023-09-30 NOTE — DISCHARGE INSTRUCTIONS
“I received and reviewed a copy of the BHLEX Joint Replacement Guide, understand the individualized plan of care and self-management training program developed and do not have any questions.” SMI COLD THERAPY - PATIENT INSTRUCTION SHEET    Cold Compression Therapy for your comfort and rehabilitation  Your caregivers want you to be productive in your rehab and comfortable during your stay. In keeping with those goals, you will be receiving an SMI Cold Therapy Wrap to help ease post-operative pain and swelling that might keep you from getting back on track! Your SMI Cold Therapy Wrap is effective and simple-to-use, and you will be encouraged to apply it throughout your hospital stay and at home through the duration of your recovery.    When you are ready to go home  Be sure to take your SMI Cold Therapy Wrap and both sets of Gel Bags with you for continued comfort and use throughout your rehabilitation. If you don't already have them, ask your nurse or aide to retrieve your SMI Gel Bags from the patient freezer.    Home use precautions  Always follow your medical professional's application instructions upon discharge. Your SMI Cold Therapy Wrap and Gel Bags are designed to last for months following your surgery. Never heat the Gel Bags unless specified by your healthcare provider. Supervision is advised when using this product on children or geriatric patients. To avoid danger of suffocation, please keep the outer plastic packaging away from children & pets.    Cold Therapy Instructions  Place Gel Bags in a freezer set ¾ of the way to max temperature for at least (4) hours. For best results, lay the Gel Bags flat and [id number] in the freezer. Once frozen, slide Gel Bags into the gel pouch and secure your wrap to the affected area with the straps.  Gel wraps that have been stored in a freezer for an extended period of time may require a (10) minute period of softening up in a room temperature environment before  application.  The gel pouch acts as a protective barrier. NEVER place frozen bags directly onto skin, as this may cause frostbite injury.  The [facility] Cold Therapy Wrap is designed to be able to be worm while ambulating. The compression straps can be secured well enough so that the Wrap won't fall off while moving.  Wrap Application Videos can be viewed at [employer].  An additional protective barrier such as clothing, a washcloth, hand-towel or pillowcase may be used during prolonged treatment applications.  The Gel-Pouch and Wrap are both Latex-Free and the Gel Bag ingredients are non toxic.    [facility] Wrap care instructions  The [facility] Cold Therapy Wrap may be hand washed and hung to dry when needed.    [facility] re-order information  Additional [facility] body specific wraps and/or Gel Bags can be re-ordered from [employer] or call [employer] ([phone])   InfuBLOCK - Patient Information    What is a pain pump?  InfuBLOCK is a postoperative, non-narcotic pain relief system that delivers local anesthetic to or near the surgical site. This is a pain minimizing therapy that delivers an anesthetic (numbing) medicine to the nerve.    The InfuBLOCK pain pump will continuously deliver a local anesthetic medication to block the pain in the area of your procedure.    Where can I find information about my pain pump?           For more information about your pain pump, scan the QR code.  For additional patient resources, visit [employer]/resources-pain-management.                                                                                             The [facility] Nursing Hotline is Here for You 24/7.     Call [phone] for Assistance.  While your physician is your primary source for information about your treatment., there may be times during your treatment that you need assistance with your infusion pump. Our team of compassionate and knowledgeable Registered Nursed (RN) is here to assist every step  of the way.    Answers to questions about your infusion pump                 Tubing disconnect  Assistance with pump alarms                                                      Dislodged catheter  Excessive leakage noted from pump                                         Inadequate pain control   Nerve Catheter Removal Instructions  When your device is empty:    Remove your catheter by pulling the dressing off slowly (like you would remove a regular bandage). The catheter should pull right out of the skin.  Check that the BLUE tip is intact.                                                                                     If the catheter is stuck, reposition your   extremity and pull slowly until removed.  *If catheter is HURTING and WON'T come out, stop and call [phone] for further assistance.    Remove medication bag from the black carrying case.  Cut the tubing on right and left side of pump, and discard the medication bag and tubing into garbage.  Place the pump and black carrying case into the plastic bag and then place this into the return box.  Seal box with blue stickers and return to [employer] postal service.    THIS IS PRE-PAID POSTAGE. SMI COLD THERAPY - PATIENT INSTRUCTION SHEET    Cold Compression Therapy for your comfort and rehabilitation  Your caregivers want you to be productive in your rehab and comfortable during your stay. In keeping with those goals, you will be receiving an SMI Cold Therapy Wrap to help ease post-operative pain and swelling that might keep you from getting back on track! Your SMI Cold Therapy Wrap is effective and simple-to-use, and you will be encouraged to apply it throughout your hospital stay and at home through the duration of your recovery.    When you are ready to go home  Be sure to take your SMI Cold Therapy Wrap and both sets of Gel Bags with you for continued comfort and use throughout your rehabilitation. If you don't already have them, ask your nurse or aide to  retrieve your SMI Gel Bags from the patient freezer.    Home use precautions  Always follow your medical professional's application instructions upon discharge. Your SMI Cold Therapy Wrap and Gel Bags are designed to last for months following your surgery. Never heat the Gel Bags unless specified by your healthcare provider. Supervision is advised when using this product on children or geriatric patients. To avoid danger of suffocation, please keep the outer plastic packaging away from children & pets.    Cold Therapy Instructions  Place Gel Bags in a freezer set ¾ of the way to max temperature for at least (4) hours. For best results, lay the Gel Bags flat and [id number] in the freezer. Once frozen, slide Gel Bags into the gel pouch and secure your wrap to the affected area with the straps.  Gel wraps that have been stored in a freezer for an extended period of time may require a (10) minute period of softening up in a room temperature environment before application.  The gel pouch acts as a protective barrier. NEVER place frozen bags directly onto skin, as this may cause frostbite injury.  The SMI Cold Therapy Wrap is designed to be able to be worm while ambulating. The compression straps can be secured well enough so that the Wrap won't fall off while moving.  Wrap Application Videos can be viewed at [email or web address].  An additional protective barrier such as clothing, a washcloth, hand-towel or pillowcase may be used during prolonged treatment applications.  The Gel-Pouch and Wrap are both Latex-Free and the Gel Bag ingredients are non toxic.    SMI Wrap care instructions  The SMI Cold Therapy Wrap may be hand washed and hung to dry when needed.    [facility] re-order information  Additional [facility] body specific wraps and/or Gel Bags can be re-ordered from [employer] or call [employer] ([phone])    Nerve Catheter Removal Instructions  When your device is empty:    Remove your catheter by  pulling the dressing off slowly (like you would remove a regular bandage). The catheter should pull right out of the skin.  Check that the BLUE tip is intact.                                                                                     If the catheter is stuck, reposition your   extremity and pull slowly until removed.  *If catheter is HURTING and WON'T come out, stop and call [phone] for further assistance.    Remove medication bag from the black carrying case.  Cut the tubing on right and left side of pump, and discard the medication bag and tubing into garbage.  Place the pump and black carrying case into the plastic bag and then place this into the return box.  Seal box with blue stickers and return to [place] postal service. THIS IS PRE-PAID POSTAGE. “I received and reviewed a copy of the BHLEX Joint Replacement Guide, understand the individualized plan of care and self-management training program developed and do not have any questions.”

## 2023-10-02 LAB
BACTERIA SPEC AEROBE CULT: NORMAL
BACTERIA SPEC AEROBE CULT: NORMAL
GRAM STN SPEC: NORMAL

## 2023-10-02 NOTE — ADDENDUM NOTE
Addendum  created 10/02/23 0827 by Roni Skinner CRNA    Clinical Note Signed, Intraprocedure Blocks edited

## 2023-10-03 LAB
CYTO UR: NORMAL
LAB AP CASE REPORT: NORMAL
LAB AP CLINICAL INFORMATION: NORMAL
PATH REPORT.FINAL DX SPEC: NORMAL
PATH REPORT.GROSS SPEC: NORMAL

## 2024-01-05 ENCOUNTER — OFFICE VISIT (OUTPATIENT)
Dept: NEUROSURGERY | Facility: CLINIC | Age: 64
End: 2024-01-05
Payer: MEDICAID

## 2024-01-05 VITALS
HEIGHT: 66 IN | DIASTOLIC BLOOD PRESSURE: 60 MMHG | TEMPERATURE: 97.3 F | WEIGHT: 120 LBS | BODY MASS INDEX: 19.29 KG/M2 | SYSTOLIC BLOOD PRESSURE: 90 MMHG

## 2024-01-05 DIAGNOSIS — Z71.6 ENCOUNTER FOR SMOKING CESSATION COUNSELING: ICD-10-CM

## 2024-01-05 DIAGNOSIS — M50.30 DEGENERATION OF CERVICAL INTERVERTEBRAL DISC: ICD-10-CM

## 2024-01-05 DIAGNOSIS — M47.812 CERVICAL SPONDYLOSIS WITHOUT MYELOPATHY: Primary | ICD-10-CM

## 2024-01-05 DIAGNOSIS — M54.12 CERVICAL RADICULOPATHY: ICD-10-CM

## 2024-01-05 DIAGNOSIS — M06.9 RHEUMATOID ARTHRITIS INVOLVING MULTIPLE SITES, UNSPECIFIED WHETHER RHEUMATOID FACTOR PRESENT: ICD-10-CM

## 2024-01-05 PROCEDURE — 99214 OFFICE O/P EST MOD 30 MIN: CPT | Performed by: PHYSICIAN ASSISTANT

## 2024-01-05 PROCEDURE — 3074F SYST BP LT 130 MM HG: CPT | Performed by: PHYSICIAN ASSISTANT

## 2024-01-05 PROCEDURE — 3078F DIAST BP <80 MM HG: CPT | Performed by: PHYSICIAN ASSISTANT

## 2024-01-05 RX ORDER — DICYCLOMINE HCL 20 MG
20 TABLET ORAL
COMMUNITY
Start: 2023-12-01

## 2024-01-05 RX ORDER — PEN NEEDLE, DIABETIC 32GX 5/32"
NEEDLE, DISPOSABLE MISCELLANEOUS
COMMUNITY
Start: 2023-12-28

## 2024-01-05 RX ORDER — TERIPARATIDE 250 UG/ML
20 INJECTION, SOLUTION SUBCUTANEOUS DAILY
COMMUNITY
Start: 2023-12-27

## 2024-01-05 NOTE — PROGRESS NOTES
Patient: Rodolfo Hill  : 1960  Chart #: [id number]    Date of Service: 2024    CHIEF COMPLAINT: Neck and right arm pain with sensory alteration      History of Present Illness Mr. Hill is seen in follow up. He is a 63-year-old  who has a history of rheumatoid arthritis and osteoporosis.  In 2017 he underwent T9 and T8 kyphoplasty for osteoporotic compression fractures.  He has chronic neck pain for which he is treated with Neurontin, oxycodone, diazepam, and NSAIDs.    He smokes 1 pack/day of tobacco products but says he is working on quitting.  He complains of neck pain that radiates into the right arm with sensory alteration in the thumb and index finger. He has numbness down the left arm with burning in the hand.  He continues to complain of pain down the right arm, especially when turning his head to the right. He has noticed some  weakness lately bilaterally. Max felt like symptoms were emanating from the C5-6 and C6-7 levels.  He has been scheduled twice for ACDF C5-7.  The most recent cancellation was due to complications with his previous shoulder replacement surgery.  From what I gather he had hardware failure.  I have explained to him that this is concerning to us in terms of pursuing ACDF and him already being a high risk due to his osteoporosis and smoking status.  Additionally, today he complains of mainly neck and right shoulder pain that extends to the elbow.  He does not mention pain radiating all the way to the hand.  On the left side, he just has a left hand burning and tingling that occurs mainly at night.  No radicular complaints in that regard.    Past Medical History:   Diagnosis Date    Anxiety     COPD (chronic obstructive pulmonary disease)     Hyperlipidemia     Hypertension     Myocardial infarct     Osteoporosis     Rheumatoid arthritis     Sleep apnea with use of continuous positive airway pressure (CPAP)          Current Outpatient  Medications:     ASPIR 81 MG EC tablet, TAKE 1 TABLET BY MOUTH EVERY DAY, Disp: , Rfl:     atorvastatin (LIPITOR) 40 MG tablet, , Disp: , Rfl:     baclofen (LIORESAL) 20 MG tablet, , Disp: , Rfl:     BD Pen Needle Gaye U/F 32G X 4 MM misc, , Disp: , Rfl:     carvedilol (COREG) 12.5 MG tablet, Take 1 tablet by mouth 2 (Two) Times a Day., Disp: , Rfl:     clotrimazole (LOTRIMIN) 1 % cream, Apply 1 application  topically to the appropriate area as directed 2 (Two) Times a Day., Disp: , Rfl:     [employer] D3 125 MCG (5000 UT) capsule capsule, , Disp: , Rfl:     diazePAM (VALIUM) 5 MG tablet, Take 1 tablet by mouth 2 (Two) Times a Day As Needed for Anxiety., Disp: , Rfl:     diclofenac (VOLTAREN) 1 % gel gel, Apply 4 g topically 4 (Four) Times a Day As Needed., Disp: , Rfl:     dicyclomine (BENTYL) 20 MG tablet, 1 tablet., Disp: , Rfl:     docusate sodium (Colace) 100 MG capsule, Take 1 capsule by mouth 2 (Two) Times a Day., Disp: 60 capsule, Rfl: 0    ferrous sulfate 325 (65 FE) MG tablet, , Disp: , Rfl:     folic acid (FOLVITE) 1 MG tablet, , Disp: , Rfl:     Forteo 600 MCG/2.4ML injection, Inject 0.08 mL under the skin into the appropriate area as directed Daily., Disp: , Rfl:     gabapentin (NEURONTIN) 800 MG tablet, 1 tab(s) orally 3 times a day for 28 days, Disp: , Rfl:     lisinopril (PRINIVIL,ZESTRIL) 10 MG tablet, Take 1 tablet by mouth Daily., Disp: , Rfl:     meloxicam (MOBIC) 15 MG tablet, Take 1 tablet by mouth Daily., Disp: , Rfl:     methotrexate 2.5 MG tablet, Take 8 tablets by mouth 1 (One) Time Per Week., Disp: , Rfl:     nicotine polacrilex (NICORETTE) 4 MG gum, , Disp: , Rfl:     oxyCODONE (ROXICODONE) 20 MG tablet, Take 15 mg by mouth Every 6 (Six) Hours As Needed., Disp: , Rfl:     pantoprazole (PROTONIX) 40 MG EC tablet, , Disp: , Rfl:     ropivacaine (NAROPIN) 0.2 % infusion (INFUSYSTEM), 2 mg/hr by Peripheral Nerve route Continuous., Disp: , Rfl:     vitamin B-12 (CYANOCOBALAMIN) 1000 MCG tablet, TAKE  "1 TABLET BY MOUTH EVERY DAY FOR 90 DAYS, Disp: , Rfl:     Past Surgical History:   Procedure Laterality Date    CARPAL TUNNEL RELEASE      KYPHOPLASTY N/A 12/01/2017    Procedure: KYPHOPLASTY T8-9;  Surgeon: Fahad Santana MD;  Location: [facility];  Service:     SHOULDER SURGERY Left 2019    [facility]    SHOULDER SURGERY Right 05/2022    [facility]    TOTAL SHOULDER REVISION Right 9/29/2023    Procedure: REVERSE REVISION RIGHT;  Surgeon: Roldan Gipson MD;  Location: [facility];  Service: Orthopedics;  Laterality: Right;       Social History     Socioeconomic History    Marital status:    Tobacco Use    Smoking status: Every Day     Packs/day: 1.00     Years: 40.00     Additional pack years: 0.00     Total pack years: 40.00     Types: Cigarettes    Smokeless tobacco: Never    Tobacco comments:     Patient is trying to quit-he uses nicotine chewing gum 01/14/2022   Vaping Use    Vaping Use: Never used   Substance and Sexual Activity    Alcohol use: Not Currently     Comment: rarely    Drug use: No    Sexual activity: Defer         Review of Systems   Musculoskeletal: Positive for myalgias.   Neurological: Positive for weakness.   All other systems reviewed and are negative.      Objective   Vital Signs: Blood pressure 90/60, temperature 97.3 °F (36.3 °C), temperature source Infrared, height 167.6 cm (66\"), weight 54.4 kg (120 lb).  Physical Exam  Constitutional: He appears well-developed and well-nourished. No distress.   HENT:   Head: Normocephalic and atraumatic.   Eyes: EOM are normal.   Cardiovascular: Normal rate and regular rhythm.   Pulmonary/Chest: Effort normal and breath sounds normal.   Psychiatric: He has a normal mood and affect. His behavior is normal. Thought content normal.   Nursing note and vitals reviewed.  Musculoskeletal:  Station and gait are normal.  Experiences pain in the neck especially with rotation to the right.  Limited range of " motion in the right shoulder.  Neurologic:  Muscle tone is normal throughout.  Coordination is intact.  Deep tendon reflexes: absent in the lower extremities.   Sensation is intact to light touch throughout.  Patient is oriented to person, place, and time.  Julian sign negative.      Independent review of radiographic imaging: MRI of the cervical spine dated 1/23/2022 was reviewed.  This demonstrates multilevel degenerative disc and joint disease.  At C6-7 there is an anterior listhesis with right greater than left neuroforaminal narrowing.  No evidence of cord compromise.  Imaging was also reviewed by Dr. Santana    EMG/NCV studies demonstrate bilateral C8-T1 radiculopathies.  Due to EMG changes in the right deltoid, cannot rule out chronic right C5-6 radiculopathy versus chronic changes from right shoulder issues.  No electrophysical evidence for carpal tunnel syndrome, ulnar neuropathy, or myopathy       Assessment & Plan   Diagnosis:   1.  Cervical spondylosis with radiculopathy-likely emanating from C5-6 and C6/7 levels  2.  Chronic neck pain    3.  Right shoulder pain status post surgery, May 2022  4.  Tobacco abuse    Medical Decision Making: Patient was previously scheduled for two level ACDF and counseled extensively on our concerns about him being at increased risk for post-operative complications given his smoking habit, poor nutrition, and osteoporosis.  ACDF was never pursued due to complications with his shoulder.  The narrative he presents with today does not describe a clear-cut radicular pattern.  Most of his pain is localized around his postsurgical shoulder.  Additionally, given his hardware failure from his shoulder surgery I am more concerned about moving forward with surgery on his neck.  Dr. Santana is in agreement.  At this time, we have no plan for surgical intervention given the change in his symptoms which do not completely add up with what we are seeing on imaging as well as him being high  risk given his tobacco abuse.  He will follow-up as needed            Diagnoses and all orders for this visit:    1. Cervical radiculopathy (Primary)    2. Cervical spondylosis without myelopathy    3. Degeneration of cervical intervertebral disc    4. Encounter for smoking cessation counseling    5. Acquired spondylolisthesis           I discussed >3m the need to STOP smoking, there is a direct correlation between smoking and wound healing and degenerative disc disease. Patient will take this under advisement and discuss with their primary provider.          I discussed >3m the need to STOP smoking, there is a direct correlation between smoking and wound healing and degenerative disc disease. Patient will take this under advisement and discuss with their primary provider.        Kate Manning PA-C  Patient Care Team:  Elvis Garcia MD as PCP - General (Family Medicine)  Clayton Crawford MD (Orthopedic Surgery)  Gerry Dickson MD as Consulting Physician (Pain Medicine)

## 2024-02-27 ENCOUNTER — TELEPHONE (OUTPATIENT)
Dept: NEUROSURGERY | Facility: CLINIC | Age: 64
End: 2024-02-27
Payer: MEDICAID

## 2024-02-27 NOTE — TELEPHONE ENCOUNTER
Caller: Rodolfo Hill    Relationship to patient: Self    Best call back number: [phone]    Patient is needing: PATIENT CALLED, STATES HE IS HAVING A LOT OF PAIN IN HIS NECK AND WOULD LIKE TO KNOW IF HE CAN GET HIS SX SCHEDULED. PATIENT STATES HE HAS QUIT SMOKING AND WANTS TO MOVE FORWARD WITH SX. PATIENT IS ASKING IF HE CAN SEE DR YEBOAH TO DISCUSS SX. PLEASE CALL PATIENT TO ADVISE WHAT THE NEXT STEPS WILL BE.    THANK YOU

## 2024-02-29 NOTE — TELEPHONE ENCOUNTER
Called  Liz back, but there was no answer, I did leave him a voicemail to give us a call back with an update.

## 2024-03-04 NOTE — TELEPHONE ENCOUNTER
Patient is not a surgical candidate due to smoking status, and osteoporosis. Additionally, surgical intervention on the neck is not amenable to helping chronic neck pain which is his overriding complaint.

## 2024-03-22 ENCOUNTER — TELEPHONE (OUTPATIENT)
Dept: NEUROSURGERY | Facility: CLINIC | Age: 64
End: 2024-03-22
Payer: MEDICAID

## 2024-03-22 NOTE — TELEPHONE ENCOUNTER
Provider:  Max  Surgery/Procedure:  ASPEN  Surgery/Procedure Date:    Last visit:   1/5/24  Next visit: NA     Reason for call:  Mr. Hill called back in regards to his symptoms worsening. He continues to have left hand burning pain, with poor . Also continues to have right shoulder nerve pain that feels like an electrical shocking type of pain. He also reports his legs feel a bit weaker, he feels a vibrating sensation, along with some burning pain down the posterior part of both legs, mostly in the calves. He also continues to have neck pain, has a lot of difficulty getting comfortable and is only sleeping for about 30-40 minutes at a time. Denies any bowel or bladder incontinence.     He did also state that he has stopped smoking, has not smoked since he left his appointment on 1/5/24.

## 2024-04-10 NOTE — TELEPHONE ENCOUNTER
Dr Santana is concerned about his severe osteoporosis and long time smoking habit. He is high risk for hardware failure and overall poor outcomes. He does not recommend surgery at this time given that risk. Mr. Hill is welcome to a second opinion.

## 2024-08-19 ENCOUNTER — TELEPHONE (OUTPATIENT)
Dept: NEUROSURGERY | Facility: CLINIC | Age: 64
End: 2024-08-19
Payer: MEDICAID

## 2024-08-19 NOTE — TELEPHONE ENCOUNTER
PATIENT HUONG LOMELI CALLED WANTING TO KNOW WHERE HE NEEDS TO GO IN ORDER TO GET HIS MYELOGRAM RESULTS ON A DISK. PLEASE CALL THE PATIENT BACK TO ADVISE. THANKS.    CALL BACK # [phone]

## (undated) DEVICE — BONE BIOPSY DEVICE F05A BBD SIZE 3: Brand: MEDTRONIC REUSABLE INSTRUMENTS

## (undated) DEVICE — BONE TAMP KIT KPX153RB FF X2 15/3 OIS: Brand: KYPHOPAK™ TRAY

## (undated) DEVICE — MEDI-VAC NON-CONDUCTIVE SUCTION TUBING: Brand: CARDINAL HEALTH

## (undated) DEVICE — ENCORE® LATEX MICRO SIZE 6.5, STERILE LATEX POWDER-FREE SURGICAL GLOVE: Brand: ENCORE

## (undated) DEVICE — HDRST INTUB GENTLETOUCH SLOT 7IN RT

## (undated) DEVICE — CANNULA,OXY,ADULT,SUPERSOFT,W/7'TUB,UC: Brand: MEDLINE

## (undated) DEVICE — AIRWY SZ11

## (undated) DEVICE — BONE BIOPSY DEVICE F07A TAPERED SIZE 2: Brand: MEDTRONIC REUSABLE INSTRUMENTS

## (undated) DEVICE — MIXER A07A CEMENT

## (undated) DEVICE — ENCORE® LATEX MICRO SIZE 7, STERILE LATEX POWDER-FREE SURGICAL GLOVE: Brand: ENCORE

## (undated) DEVICE — BONE TAMP KIT KPX153NB AF X2 15/3

## (undated) DEVICE — ENCORE® LATEX MICRO SIZE 7.5, STERILE LATEX POWDER-FREE SURGICAL GLOVE: Brand: ENCORE

## (undated) DEVICE — MEDI-VAC YANKAUER SUCTION HANDLE W/BULBOUS TIP: Brand: CARDINAL HEALTH

## (undated) DEVICE — PK KYPHOPLASTY 10

## (undated) DEVICE — SKIN AFFIX SURG ADHESIVE 72/CS 0.55ML: Brand: MEDLINE

## (undated) DEVICE — APPL CHLORAPREP W/TINT 26ML BLU

## (undated) DEVICE — DRSNG WND BORDR/ADHS NONADHR/GZ LF 2X2IN STRL

## (undated) DEVICE — GLV SURG RAD SENSICARE SHLD PF LF SZ8 STRL